# Patient Record
Sex: FEMALE | Race: WHITE | Employment: FULL TIME | URBAN - METROPOLITAN AREA
[De-identification: names, ages, dates, MRNs, and addresses within clinical notes are randomized per-mention and may not be internally consistent; named-entity substitution may affect disease eponyms.]

---

## 2019-01-12 ENCOUNTER — HOSPITAL ENCOUNTER (EMERGENCY)
Age: 39
Discharge: HOME | End: 2019-01-13
Attending: EMERGENCY MEDICINE
Payer: COMMERCIAL

## 2019-01-12 VITALS
BODY MASS INDEX: 25.61 KG/M2 | TEMPERATURE: 98 F | DIASTOLIC BLOOD PRESSURE: 63 MMHG | HEART RATE: 94 BPM | WEIGHT: 150 LBS | OXYGEN SATURATION: 100 % | HEIGHT: 64 IN | RESPIRATION RATE: 20 BRPM | SYSTOLIC BLOOD PRESSURE: 114 MMHG

## 2019-01-12 DIAGNOSIS — R10.31 ABDOMINAL PAIN, RIGHT LOWER QUADRANT: ICD-10-CM

## 2019-01-12 DIAGNOSIS — K52.9 ENTERITIS: Primary | ICD-10-CM

## 2019-01-12 LAB
ALBUMIN SERPL-MCNC: 4.2 G/DL (ref 3.4–5)
ALBUMIN/GLOB SERPL: 1.2 {RATIO} (ref 0.8–1.7)
ALP SERPL-CCNC: 56 U/L (ref 45–117)
ALT SERPL-CCNC: 16 U/L (ref 13–56)
ANION GAP SERPL CALC-SCNC: 9 MMOL/L (ref 3–18)
AST SERPL-CCNC: 12 U/L (ref 15–37)
BASOPHILS # BLD: 0 K/UL (ref 0–0.1)
BASOPHILS NFR BLD: 0 % (ref 0–2)
BILIRUB SERPL-MCNC: 2.2 MG/DL (ref 0.2–1)
BUN SERPL-MCNC: 8 MG/DL (ref 7–18)
BUN/CREAT SERPL: 9 (ref 12–20)
CALCIUM SERPL-MCNC: 9.4 MG/DL (ref 8.5–10.1)
CHLORIDE SERPL-SCNC: 104 MMOL/L (ref 100–108)
CO2 SERPL-SCNC: 26 MMOL/L (ref 21–32)
CREAT SERPL-MCNC: 0.89 MG/DL (ref 0.6–1.3)
DIFFERENTIAL METHOD BLD: ABNORMAL
EOSINOPHIL # BLD: 0 K/UL (ref 0–0.4)
EOSINOPHIL NFR BLD: 0 % (ref 0–5)
ERYTHROCYTE [DISTWIDTH] IN BLOOD BY AUTOMATED COUNT: 12.8 % (ref 11.6–14.5)
GLOBULIN SER CALC-MCNC: 3.4 G/DL (ref 2–4)
GLUCOSE SERPL-MCNC: 118 MG/DL (ref 74–99)
HCG SERPL QL: NEGATIVE
HCT VFR BLD AUTO: 34.4 % (ref 35–45)
HGB BLD-MCNC: 11.5 G/DL (ref 12–16)
LYMPHOCYTES # BLD: 1.1 K/UL (ref 0.9–3.6)
LYMPHOCYTES NFR BLD: 6 % (ref 21–52)
MCH RBC QN AUTO: 29.6 PG (ref 24–34)
MCHC RBC AUTO-ENTMCNC: 33.4 G/DL (ref 31–37)
MCV RBC AUTO: 88.7 FL (ref 74–97)
MONOCYTES # BLD: 0.8 K/UL (ref 0.05–1.2)
MONOCYTES NFR BLD: 5 % (ref 3–10)
NEUTS SEG # BLD: 16 K/UL (ref 1.8–8)
NEUTS SEG NFR BLD: 89 % (ref 40–73)
PLATELET # BLD AUTO: 357 K/UL (ref 135–420)
PMV BLD AUTO: 9.7 FL (ref 9.2–11.8)
POTASSIUM SERPL-SCNC: 3.7 MMOL/L (ref 3.5–5.5)
PROT SERPL-MCNC: 7.6 G/DL (ref 6.4–8.2)
RBC # BLD AUTO: 3.88 M/UL (ref 4.2–5.3)
SODIUM SERPL-SCNC: 139 MMOL/L (ref 136–145)
WBC # BLD AUTO: 17.9 K/UL (ref 4.6–13.2)

## 2019-01-12 PROCEDURE — 74011250636 HC RX REV CODE- 250/636: Performed by: EMERGENCY MEDICINE

## 2019-01-12 PROCEDURE — 84703 CHORIONIC GONADOTROPIN ASSAY: CPT

## 2019-01-12 PROCEDURE — 80053 COMPREHEN METABOLIC PANEL: CPT

## 2019-01-12 PROCEDURE — 96375 TX/PRO/DX INJ NEW DRUG ADDON: CPT

## 2019-01-12 PROCEDURE — 96374 THER/PROPH/DIAG INJ IV PUSH: CPT

## 2019-01-12 PROCEDURE — 99284 EMERGENCY DEPT VISIT MOD MDM: CPT

## 2019-01-12 PROCEDURE — 85025 COMPLETE CBC W/AUTO DIFF WBC: CPT

## 2019-01-12 RX ORDER — ONDANSETRON 2 MG/ML
4 INJECTION INTRAMUSCULAR; INTRAVENOUS
Status: COMPLETED | OUTPATIENT
Start: 2019-01-12 | End: 2019-01-12

## 2019-01-12 RX ORDER — MORPHINE SULFATE 4 MG/ML
4 INJECTION INTRAVENOUS
Status: COMPLETED | OUTPATIENT
Start: 2019-01-12 | End: 2019-01-12

## 2019-01-12 RX ADMIN — ONDANSETRON 4 MG: 2 INJECTION INTRAMUSCULAR; INTRAVENOUS at 23:45

## 2019-01-12 RX ADMIN — MORPHINE SULFATE 4 MG: 4 INJECTION INTRAVENOUS at 23:45

## 2019-01-13 ENCOUNTER — APPOINTMENT (OUTPATIENT)
Dept: CT IMAGING | Age: 39
End: 2019-01-13
Attending: EMERGENCY MEDICINE
Payer: COMMERCIAL

## 2019-01-13 PROCEDURE — 74011250637 HC RX REV CODE- 250/637: Performed by: EMERGENCY MEDICINE

## 2019-01-13 PROCEDURE — 74011636320 HC RX REV CODE- 636/320: Performed by: EMERGENCY MEDICINE

## 2019-01-13 PROCEDURE — 74177 CT ABD & PELVIS W/CONTRAST: CPT

## 2019-01-13 RX ORDER — DICYCLOMINE HYDROCHLORIDE 20 MG/1
20 TABLET ORAL EVERY 6 HOURS
Qty: 20 TAB | Refills: 0 | Status: SHIPPED | OUTPATIENT
Start: 2019-01-13 | End: 2019-01-18

## 2019-01-13 RX ORDER — HYDROCODONE BITARTRATE AND ACETAMINOPHEN 5; 325 MG/1; MG/1
TABLET ORAL
Qty: 8 TAB | Refills: 0 | Status: SHIPPED | OUTPATIENT
Start: 2019-01-13 | End: 2019-04-19

## 2019-01-13 RX ORDER — DICYCLOMINE HYDROCHLORIDE 10 MG/1
20 CAPSULE ORAL
Status: COMPLETED | OUTPATIENT
Start: 2019-01-13 | End: 2019-01-13

## 2019-01-13 RX ORDER — ONDANSETRON 4 MG/1
TABLET, ORALLY DISINTEGRATING ORAL
Qty: 10 TAB | Refills: 0 | Status: SHIPPED | OUTPATIENT
Start: 2019-01-13 | End: 2019-04-19

## 2019-01-13 RX ADMIN — DICYCLOMINE HYDROCHLORIDE 20 MG: 10 CAPSULE ORAL at 02:00

## 2019-01-13 RX ADMIN — IOPAMIDOL 75 ML: 612 INJECTION, SOLUTION INTRAVENOUS at 00:15

## 2019-01-13 NOTE — DISCHARGE INSTRUCTIONS
Gastroenteritis: Care Instructions  Your Care Instructions    Gastroenteritis is an illness that may cause nausea, vomiting, and diarrhea. It is sometimes called \"stomach flu. \" It can be caused by bacteria or a virus. You will probably begin to feel better in 1 to 2 days. In the meantime, get plenty of rest and make sure you do not become dehydrated. Dehydration occurs when your body loses too much fluid. Follow-up care is a key part of your treatment and safety. Be sure to make and go to all appointments, and call your doctor if you are having problems. It's also a good idea to know your test results and keep a list of the medicines you take. How can you care for yourself at home? · If your doctor prescribed antibiotics, take them as directed. Do not stop taking them just because you feel better. You need to take the full course of antibiotics. · Drink plenty of fluids to prevent dehydration, enough so that your urine is light yellow or clear like water. Choose water and other caffeine-free clear liquids until you feel better. If you have kidney, heart, or liver disease and have to limit fluids, talk with your doctor before you increase your fluid intake. · Drink fluids slowly, in frequent, small amounts, because drinking too much too fast can cause vomiting. · Begin eating mild foods, such as dry toast, yogurt, applesauce, bananas, and rice. Avoid spicy, hot, or high-fat foods, and do not drink alcohol or caffeine for a day or two. Do not drink milk or eat ice cream until you are feeling better. How to prevent gastroenteritis  · Keep hot foods hot and cold foods cold. · Do not eat meats, dressings, salads, or other foods that have been kept at room temperature for more than 2 hours. · Use a thermometer to check your refrigerator. It should be between 34°F and 40°F.  · Defrost meats in the refrigerator or microwave, not on the kitchen counter. · Keep your hands and your kitchen clean.  Wash your hands, cutting boards, and countertops with hot soapy water frequently. · Cook meat until it is well done. · Do not eat raw eggs or uncooked sauces made with raw eggs. · Do not take chances. If food looks or tastes spoiled, throw it out. When should you call for help? Call 911 anytime you think you may need emergency care. For example, call if:    · You vomit blood or what looks like coffee grounds.     · You passed out (lost consciousness).     · You pass maroon or very bloody stools.    Call your doctor now or seek immediate medical care if:    · You have severe belly pain.     · You have signs of needing more fluids. You have sunken eyes, a dry mouth, and pass only a little dark urine.     · You feel like you are going to faint.     · You have increased belly pain that does not go away in 1 to 2 days.     · You have new or increased nausea, or you are vomiting.     · You have a new or higher fever.     · Your stools are black and tarlike or have streaks of blood.    Watch closely for changes in your health, and be sure to contact your doctor if:    · You are dizzy or lightheaded.     · You urinate less than usual, or your urine is dark yellow or brown.     · You do not feel better with each day that goes by. Where can you learn more? Go to http://nena-noreen.info/. Enter N142 in the search box to learn more about \"Gastroenteritis: Care Instructions. \"  Current as of: November 18, 2017  Content Version: 11.8  © 4252-2948 [employer]. Care instructions adapted under license by [employer] (which disclaims liability or warranty for this information). If you have questions about a medical condition or this instruction, always ask your healthcare professional. [address] any warranty or liability for your use of this information.

## 2019-01-13 NOTE — ED TRIAGE NOTES
Patient with complaints of chills and aches yesterday. Patient states that she started with lower abdominal pain. Patient works at [employer] where they did a CBC.

## 2019-01-13 NOTE — ED PROVIDER NOTES
EMERGENCY DEPARTMENT HISTORY AND PHYSICAL EXAM 
 
Date: 2019 Patient Name: Yovani Bronson History of Presenting Illness Chief Complaint Patient presents with  Abdominal Pain History Provided By: Patient Chief Complaint: Abdominal pain Duration: 2 Days Timing:  Worsening Location: RLQ Quality: Cramping Severity: 8 out of 10 Modifying Factors: Motrin taken to moderate relief Associated Symptoms: nausea Additional History (Context):  
11:01 PM 
Yovani Bronson is a 34 y.o. female with PMHX of dermoid cysts who presents to the emergency department C/O worsening 8/10 RLQ cramping onset yesterday that significantly worsened 2 hours ago. Associated sxs include nausea. Patient reports that she ate [employer] yesterday and had mild abdominal pain that was controllable with Motrin. She states that her pain was controllable throughout the day until it significantly worsened 2 hours ago. Notes PMHx of ovarian cysts but states that her pain does not feel similar. LBM was yesterday but she states that she feels like she couldn't pass gas today. Patient works at [employer] and had a CBC there that indicated a potential infection. Denies suspicion for pregnancy. Endorses occasional EtOH and denies tobacco use. Pt denies diarrhea, vomiting, and any other sxs or complaints. PCP: Thien Rodriguez MD 
 
 
 
Past History Past Medical History: 
Past Medical History:  
Diagnosis Date  Dermoid 2017 Past Surgical History: 
Past Surgical History:  
Procedure Laterality Date  HX  SECTION    
 LAP,TUBAL CAUTERY Family History: 
Family History Problem Relation Age of Onset  Hypertension Mother  Hypertension Father  Diabetes Maternal Grandmother  Colon Cancer Maternal Grandfather  Diabetes Paternal Grandmother Social History: 
Social History Tobacco Use  Smoking status: Never Smoker  Smokeless tobacco: Never Used Substance Use Topics  Alcohol use: Yes  Drug use: Not on file Allergies: 
No Known Allergies Review of Systems Review of Systems Constitutional: Negative. Negative for chills, diaphoresis and fever. HENT: Negative. Negative for congestion, sore throat and trouble swallowing. Eyes: Negative. Negative for photophobia, pain and redness. Respiratory: Negative. Negative for cough, chest tightness, shortness of breath and wheezing. Cardiovascular: Negative. Negative for chest pain and palpitations. Gastrointestinal: Positive for abdominal pain (RLQ cramping) and nausea. Negative for blood in stool, diarrhea and vomiting. Genitourinary: Negative for difficulty urinating, dysuria and frequency. Musculoskeletal: Negative. Negative for arthralgias, myalgias, neck pain and neck stiffness. Skin: Negative. Neurological: Negative. Negative for dizziness, tremors, seizures, syncope, speech difficulty, light-headedness and headaches. Psychiatric/Behavioral: Negative. Negative for confusion. The patient is not nervous/anxious. All other systems reviewed and are negative. Physical Exam  
 
Vitals:  
 01/12/19 2221 BP: 114/63 Pulse: 94 Resp: 20 Temp: 98 °F (36.7 °C) SpO2: 100% Weight: 68 kg (150 lb) Height: 5' 4\" (1.626 m) Physical Exam  
Constitutional: She is oriented to person, place, and time. She appears well-developed and well-nourished. Non-toxic appearance. Very uncomfortable, tearful, non-toxic HENT:  
Head: Normocephalic and atraumatic. Eyes: EOM are normal. Pupils are equal, round, and reactive to light. No scleral icterus. No pallor Neck: Normal range of motion. Neck supple. Cardiovascular: Normal rate, normal heart sounds and intact distal pulses. Pulmonary/Chest: Effort normal and breath sounds normal. No respiratory distress. Abdominal: Soft. She exhibits no distension. Bowel sounds are decreased. There is tenderness in the right lower quadrant. There is guarding. RLQ tenderness with guarding. No visible surgical scars, non-distended Musculoskeletal: Normal range of motion. She exhibits no edema. Neurological: She is alert and oriented to person, place, and time. Skin: Skin is warm and dry. No rash noted. Psychiatric: Her behavior is normal. Her mood appears anxious. Somewhat anxious Nursing note and vitals reviewed. Diagnostic Study Results Labs - Recent Results (from the past 12 hour(s)) CBC WITH AUTOMATED DIFF Collection Time: 01/12/19 11:20 PM  
Result Value Ref Range WBC 17.9 (H) 4.6 - 13.2 K/uL  
 RBC 3.88 (L) 4.20 - 5.30 M/uL  
 HGB 11.5 (L) 12.0 - 16.0 g/dL HCT 34.4 (L) 35.0 - 45.0 % MCV 88.7 74.0 - 97.0 FL  
 MCH 29.6 24.0 - 34.0 PG  
 MCHC 33.4 31.0 - 37.0 g/dL  
 RDW 12.8 11.6 - 14.5 % PLATELET 283 785 - 526 K/uL MPV 9.7 9.2 - 11.8 FL  
 NEUTROPHILS 89 (H) 40 - 73 % LYMPHOCYTES 6 (L) 21 - 52 % MONOCYTES 5 3 - 10 % EOSINOPHILS 0 0 - 5 % BASOPHILS 0 0 - 2 %  
 ABS. NEUTROPHILS 16.0 (H) 1.8 - 8.0 K/UL  
 ABS. LYMPHOCYTES 1.1 0.9 - 3.6 K/UL  
 ABS. MONOCYTES 0.8 0.05 - 1.2 K/UL  
 ABS. EOSINOPHILS 0.0 0.0 - 0.4 K/UL  
 ABS. BASOPHILS 0.0 0.0 - 0.1 K/UL  
 DF AUTOMATED METABOLIC PANEL, COMPREHENSIVE Collection Time: 01/12/19 11:20 PM  
Result Value Ref Range Sodium 139 136 - 145 mmol/L Potassium 3.7 3.5 - 5.5 mmol/L Chloride 104 100 - 108 mmol/L  
 CO2 26 21 - 32 mmol/L Anion gap 9 3.0 - 18 mmol/L Glucose 118 (H) 74 - 99 mg/dL BUN 8 7.0 - 18 MG/DL Creatinine 0.89 0.6 - 1.3 MG/DL  
 BUN/Creatinine ratio 9 (L) 12 - 20 GFR est AA >60 >60 ml/min/1.73m2 GFR est non-AA >60 >60 ml/min/1.73m2 Calcium 9.4 8.5 - 10.1 MG/DL Bilirubin, total 2.2 (H) 0.2 - 1.0 MG/DL  
 ALT (SGPT) 16 13 - 56 U/L  
 AST (SGOT) 12 (L) 15 - 37 U/L Alk. phosphatase 56 45 - 117 U/L Protein, total 7.6 6.4 - 8.2 g/dL Albumin 4.2 3.4 - 5.0 g/dL Globulin 3.4 2.0 - 4.0 g/dL A-G Ratio 1.2 0.8 - 1.7 HCG QL SERUM Collection Time: 01/12/19 11:20 PM  
Result Value Ref Range HCG, Ql. NEGATIVE  NEG Radiologic Studies -  
CT ABD PELV W CONT Final Result IMPRESSION:  
  
Inflamed small bowel loops particularly in the left abdomen with stranding in  
the mesentery and free fluid seen. Correlate for enteritis. Probable complex free fluid posterior to the uterus. Consider contrast enhanced CT or pelvic ultrasound to better evaluate and exclude adjacent mass. CT Results  (Last 48 hours) 01/13/19 0037  CT ABD PELV W CONT Final result Impression:  IMPRESSION:  
   
Inflamed small bowel loops particularly in the left abdomen with stranding in  
the mesentery and free fluid seen. Correlate for enteritis. Probable complex free fluid posterior to the uterus. Consider contrast enhanced CT or pelvic ultrasound to better evaluate and exclude adjacent mass. Narrative:  EXAM: CT of the abdomen and pelvis INDICATION: Pain. COMPARISON: None. TECHNIQUE: Axial CT imaging of the abdomen and pelvis was performed without  
intravenous contrast. Multiplanar reformats were generated. One or more dose  
reduction techniques were used on this CT: automated exposure control,  
adjustment of the mAs and/or kVp according to patient size, and iterative  
reconstruction techniques. The specific techniques used on this CT exam have  
been documented in the patient's electronic medical record. Digital Imaging and  
Communications in Medicine (DICOM) format image data are available to  
nonaffiliated external healthcare facilities or entities on a secure, media  
free, reciprocally searchable basis with patient authorization for at least a  
12-month period after this study. Lack of IV contrast is limited particularly  
regarding possible vascular or soft tissue abnormality. _______________ FINDINGS:  
   
LOWER CHEST: Unremarkable. LIVER, BILIARY: Liver is normal. No biliary dilation. Gallbladder is present. PANCREAS: Normal.  
   
SPLEEN: Normal.  
   
ADRENALS: Normal.  
   
KIDNEYS: Normal.  
   
LYMPH NODES: No enlarged lymph nodes. GASTROINTESTINAL TRACT: GI tract evaluation is limited without oral contrast.  
There is wall thickening and adjacent inflammatory stranding involving small  
bowel loops in the left abdomen. Fluid-filled small bowel loops in the low  
abdomen and pelvis. Short segment of distended small bowel in the central to  
left abdomen posterior to transverse colon with distal narrowing. Does not  
appear to be causing obstruction. The colon is not distended. Appendix is normal  
   
PELVIC ORGANS: Complex density is identified posterior to the uterus and an area  
measuring 9.1 x 4.9 cm. Could be infected or hemorrhagic fluid. Associated mass  
is not excluded. Consider contrast enhanced CT or pelvic ultrasound to better  
evaluate. Right ovarian cysts measuring up to 1.5 cm VASCULATURE: Unremarkable. BONES: No acute or aggressive osseous abnormalities identified. OTHER: There is haziness in the mesentery particularly on the left and central  
abdomen. Small amount of free fluid in the abdomen.  
   
_______________ CXR Results  (Last 48 hours) None Medications given in the ED- Medications  
dicyclomine (BENTYL) capsule 20 mg (not administered) morphine injection 4 mg (4 mg IntraVENous Given 1/12/19 2345) ondansetron [facility]) injection 4 mg (4 mg IntraVENous Given 1/12/19 2345) iopamidol (ISOVUE 300) 61 % contrast injection  mL (75 mL IntraVENous Given 1/13/19 0015) Medical Decision Making I am the first provider for this patient. I reviewed the vital signs, available nursing notes, past medical history, past surgical history, family history and social history. Vital Signs-Reviewed the patient's vital signs. Pulse Oximetry Analysis - 100% on RA Records Reviewed: Nursing Notes Provider Notes (Medical Decision Making): Strong suggestion of appendicitis or Crohns. Possibly obstruction followed by ovarian torsion or cyst, including rupture. Will check lab work, run CT scan, treat pain immediately, and disposition pending results. Procedures: 
Procedures ED Course:  
11:01 PM Initial assessment performed. The patients presenting problems have been discussed, and they are in agreement with the care plan formulated and outlined with them. I have encouraged them to ask questions as they arise throughout their visit. Diagnosis and Disposition DISCHARGE NOTE: 
1:45 AM 
Danielle Myers's  results have been reviewed with her. She has been counseled regarding her diagnosis, treatment, and plan. She verbally conveys understanding and agreement of the signs, symptoms, diagnosis, treatment and prognosis and additionally agrees to follow up as discussed. She also agrees with the care-plan and conveys that all of her questions have been answered. I have also provided discharge instructions for her that include: educational information regarding their diagnosis and treatment, and list of reasons why they would want to return to the ED prior to their follow-up appointment, should her condition change. She has been provided with education for proper emergency department utilization. CLINICAL IMPRESSION: 
 
1. Enteritis 2. Abdominal pain, right lower quadrant PLAN: 
1. D/C Home 2. Current Discharge Medication List  
  
START taking these medications Details  
ondansetron (ZOFRAN ODT) 4 mg disintegrating tablet Take 1-2 tablets every 6-8 hours as needed for nausea and vomiting. Qty: 10 Tab, Refills: 0  
  
dicyclomine (BENTYL) 20 mg tablet Take 1 Tab by mouth every six (6) hours for 20 doses. Qty: 20 Tab, Refills: 0 HYDROcodone-acetaminophen (NORCO) 5-325 mg per tablet Take 1-2 tablets PO every 4-6 hours as needed for pain control. If over the counter ibuprofen or acetaminophen was suggested, then only take the vicodin for pain not well controlled with the over the counter medication. Qty: 8 Tab, Refills: 0 Associated Diagnoses: Enteritis 3. Follow-up Information Follow up With Specialties Details Why Contact Info Alma Chicas MD Family Practice Schedule an appointment as soon as possible for a visit in 2 days For primary care follow up [place] [id number] 
[phone] [facility] EMERGENCY DEPT Emergency Medicine Go to As needed, If symptoms worsen [address] [place] [id number] 
[phone]  
  
 
_______________________________ Attestations: This note is prepared by Moi Mcguire, acting as Scribe for Radha Worthy. Radha Gore MD. Radha Wrothy. Radha Gore MD:  The scribe's documentation has been prepared under my direction and personally reviewed by me in its entirety. I confirm that the note above accurately reflects all work, treatment, procedures, and medical decision making performed by me. 
_______________________________

## 2019-04-17 ENCOUNTER — TELEPHONE (OUTPATIENT)
Dept: ONCOLOGY | Age: 39
End: 2019-04-17

## 2019-04-17 NOTE — TELEPHONE ENCOUNTER
Called patient to request that she drink a copy of her CT scan on a disk. Patient is aware and will bring it with her.

## 2019-04-18 ENCOUNTER — OFFICE VISIT (OUTPATIENT)
Dept: ONCOLOGY | Age: 39
End: 2019-04-18

## 2019-04-18 VITALS
HEART RATE: 61 BPM | SYSTOLIC BLOOD PRESSURE: 124 MMHG | OXYGEN SATURATION: 99 % | RESPIRATION RATE: 14 BRPM | BODY MASS INDEX: 24.07 KG/M2 | TEMPERATURE: 98 F | DIASTOLIC BLOOD PRESSURE: 80 MMHG | HEIGHT: 64 IN | WEIGHT: 141 LBS

## 2019-04-18 DIAGNOSIS — N70.93 TUBO-OVARIAN ABSCESS: Primary | ICD-10-CM

## 2019-04-18 RX ORDER — METRONIDAZOLE 500 MG/1
500 TABLET ORAL 3 TIMES DAILY
Qty: 14 TAB | Refills: 0 | Status: SHIPPED | OUTPATIENT
Start: 2019-04-18 | End: 2019-05-14 | Stop reason: ALTCHOICE

## 2019-04-18 RX ORDER — IBUPROFEN 200 MG
TABLET ORAL AS NEEDED
COMMUNITY

## 2019-04-18 RX ORDER — CIPROFLOXACIN 500 MG/1
500 TABLET ORAL 2 TIMES DAILY
Qty: 28 TAB | Refills: 0 | Status: SHIPPED | OUTPATIENT
Start: 2019-04-18 | End: 2019-05-02

## 2019-04-18 NOTE — PATIENT INSTRUCTIONS
Tubo-Ovarian Abscess: Care Instructions Your Care Instructions A tubo-ovarian abscess is a pocket of pus. It forms because of an infection in a fallopian tube and ovary. A tubo-ovarian abscess is most often caused by pelvic inflammatory disease (PID). Your doctor will prescribe antibiotics to treat the abscess. A very large abscess or one that does not go away after antibiotic treatment may need to be drained. Sometimes surgery is used to remove the infected tube and ovary. Follow-up care is a key part of your treatment and safety. Be sure to make and go to all appointments, and call your doctor if you are having problems. It's also a good idea to know your test results and keep a list of the medicines you take. How can you care for yourself at home? · Take your antibiotics as directed. Do not stop taking them because you feel better. You need to take the full course of antibiotics. · Rest until you feel better. · Take anti-inflammatory medicines to reduce pain. These include ibuprofen (Advil, Motrin) and naproxen (Aleve). Be safe with medicines. Read and follow all instructions on the label. · Use a hot water bottle or a heating pad set on low for belly pain. · Do not have sex or use tampons (use pads instead) until you have taken all the medicine, your pain is gone, and you feel completely well. · Talk to any sex partners you have had in the past 2 months. They need to be tested and may need to be treated for a sexually transmitted disease (STD). When should you call for help? Call your doctor now or seek immediate medical care if: 
  · You have a new or higher fever.  
  · You have unusual vaginal bleeding.  
  · You have new or worse belly or pelvic pain.  
  · You have vaginal discharge that has increased in amount or smells bad.  
 Watch closely for changes in your health, and be sure to contact your doctor if: 
  · You do not get better as expected. Where can you learn more? Go to http://nena-noreen.info/. Enter V246 in the search box to learn more about \"Tubo-Ovarian Abscess: Care Instructions. \" Current as of: May 14, 2018 Content Version: 11.9 © 1840-7092 [employer], [place]. Care instructions adapted under license by [employer] (which disclaims liability or warranty for this information). If you have questions about a medical condition or this instruction, always ask your healthcare professional. [place] [address] any warranty or liability for your use of this information.

## 2019-04-18 NOTE — PROGRESS NOTES
[address], [address] [facility] Mehreen Meeks, [address] [facility] 
[address] [facility], [address] [place], [address] [place], [place] [id number] [phone] Anh Parnell DO Patient ID: 
Name:  Luz Elena Butts MRN:  [id number] :  1980/38 y.o. Date:  2019 HISTORY OF PRESENT ILLNESS: 
Luz Elena Butts is a 34 y.o.  premenopausal female referred by Dr. Brody Kyle for tubo-ovarian abscess. Pt was diagnosed with TOA in January and underwent laparoscopic evaluation. Was then admitted on IV abx x 1 wk and discharged with oral abx x 2 wks. She felt better but then started having increasing pain so she was seen by gyn in 2019 and had an ultrasound with persistent TOA and was placed on oral abx for another week. . She then had a CT scan and seen by dr. Brody Kyle. Pt continues to have chronic pelvic pain. Denies fever/chills. Labs: 
None Imaging Scanned in media ROS:  
As above There are no active problems to display for this patient. Past Medical History:  
Diagnosis Date  Tubo-ovarian abscess Past Surgical History:  
Procedure Laterality Date  HX  SECTION  , 2006 X2  
 HX TUBAL LIGATION Bilateral 2006  HX WISDOM TEETH EXTRACTION    
 upper teeth OB History He Shah 2 Para 2 Term 2  AB Living  
2 SAB  
   
 TAB Ectopic Molar Multiple Live Births  
0 Obstetric Comments C-sections X2 Social History Tobacco Use  Smoking status: Passive Smoke Exposure - Never Smoker  Smokeless tobacco: Never Used  Tobacco comment:  vapes Substance Use Topics  Alcohol use: Yes Comment: \"occassionally\" wine Family History Problem Relation Age of Onset  Hypertension Mother  High Cholesterol Mother  Hypertension Father  High Cholesterol Father  Other Father Pre-diabetic  No Known Problems Brother  Colon Cancer Maternal Aunt  Hypertension Maternal Grandmother  Stroke Maternal Grandmother  Hypertension Maternal Grandfather  Stroke Maternal Grandfather  Colon Cancer Maternal Grandfather  Diabetes Paternal Grandmother  Hypertension Paternal Grandmother  Stroke Paternal Grandmother  Hypertension Paternal Grandfather  Stroke Paternal Grandfather Current Outpatient Medications Medication Sig  ibuprofen (MOTRIN) 200 mg tablet Take  by mouth as needed for Pain.  fexofenadine HCl (ALLEGRA PO) Take  by mouth as needed for Other (allergies).  fluticasone propionate (FLONASE NA) by Nasal route as needed for Other (allergies).  ciprofloxacin HCl (CIPRO) 500 mg tablet Take 1 Tab by mouth two (2) times a day for 14 days.  metroNIDAZOLE (FLAGYL) 500 mg tablet Take 1 Tab by mouth three (3) times daily. No current facility-administered medications for this visit. No Known Allergies OBJECTIVE: 
 
Physical Exam 
VITAL SIGNS: Visit Vitals /80 (BP 1 Location: Left arm, BP Patient Position: Sitting) Pulse 61 Temp 98 °F (36.7 °C) (Oral) Resp 14 Ht 5' 4\" (1.626 m) Wt 64 kg (141 lb) LMP 04/13/2019 SpO2 99% BMI 24.20 kg/m² GENERAL TRELL: in no apparent distress and well developed and well nourished  
deferred IMPRESSION/PLAN: 
1. Persistent TOA -will have CD uploaded and plan for IR drainage 
 -cont PO cipro/flagyl x 2 wks 
 -will reimage after 2 wks. -explained may require surgery if not resolved with drain/abx 
 -pt understands and in agreement with plan The total time spent was 60 minutes regarding this patients diagnosis of tubo-ovarian abscess and >50% of this time was spent counseling and coordinating care Janet Flores DO Gynecologic Oncology 4/18/201910:22 AM

## 2019-04-18 NOTE — PROGRESS NOTES
Grace Hernandez, a 34 y.o. female,  is here for Chief Complaint Patient presents with  New Patient  
  referred by Dr. Willi Russo for a tuboovarian abscess Visit Vitals /80 (BP 1 Location: Left arm, BP Patient Position: Sitting) Pulse 61 Temp 98 °F (36.7 °C) (Oral) Resp 14 Ht 5' 4\" (1.626 m) Wt 64 kg (141 lb) SpO2 99% BMI 24.20 kg/m² Patient reports constant pain in her pelvic area, currently rated 2 out of 10. Has been taking ibuprofen to help alleviate it. Patient reports that it does feel better on antibiotics. She was previously given Cipro and Flagyl. She did develop loose stools because of them, however that is starting to get better. Denies any unusual vaginal bleeding, discharge, irritation, or odor. No burning, discomfort, or irritation with urination.

## 2019-04-19 ENCOUNTER — TELEPHONE (OUTPATIENT)
Dept: ONCOLOGY | Age: 39
End: 2019-04-19

## 2019-04-19 DIAGNOSIS — N70.93 TUBO-OVARIAN ABSCESS: Primary | ICD-10-CM

## 2019-04-19 NOTE — TELEPHONE ENCOUNTER
LMOR re: drain placement. Per chart, patient was contacted by Reagan Woodward in IR. Advised patient to call our office with any additional questions or concerns or with new or worsening sx's, otherwise plan for procedure as scheduled on 4/23.

## 2019-04-19 NOTE — PROGRESS NOTES
PT aware of NPO status. NPO after MN night prior to procedure. PT aware of need to hold anticoagulants per protocol. Pt denies. PT aware of potential for sedation administration and need for  at discharge. PT aware of arrival time pre procedure. Arrive at [facility] radiology waiting room on 4/23/19 at 0930 for scheduled procedure to occur at 1100. Pt states no questions at this time. Gave pt [facility] radiology rn phone number [phone].

## 2019-04-22 NOTE — TELEPHONE ENCOUNTER
[facility] [place] re: IR callback re: procedure and whether she was able to pickup at least partial fill of Cipro over the weekend. Requested call back to office.

## 2019-04-22 NOTE — TELEPHONE ENCOUNTER
Patient returned WINSOME Joe's call and stated that she was able to get the Cipro and received a call answering all of her questions.

## 2019-04-23 ENCOUNTER — HOSPITAL ENCOUNTER (OUTPATIENT)
Dept: CT IMAGING | Age: 39
Discharge: HOME | End: 2019-04-23
Attending: RADIOLOGY | Admitting: RADIOLOGY
Payer: COMMERCIAL

## 2019-04-23 VITALS
TEMPERATURE: 98 F | WEIGHT: 140 LBS | DIASTOLIC BLOOD PRESSURE: 65 MMHG | HEART RATE: 82 BPM | SYSTOLIC BLOOD PRESSURE: 101 MMHG | RESPIRATION RATE: 16 BRPM | OXYGEN SATURATION: 98 % | BODY MASS INDEX: 23.9 KG/M2 | HEIGHT: 64 IN

## 2019-04-23 DIAGNOSIS — N70.93 TUBO-OVARIAN ABSCESS: ICD-10-CM

## 2019-04-23 LAB
ANION GAP SERPL CALC-SCNC: 6 MMOL/L (ref 3–18)
APTT PPP: 26.9 SEC (ref 23–36.4)
BASOPHILS # BLD: 0 K/UL (ref 0–0.1)
BASOPHILS NFR BLD: 0 % (ref 0–2)
BUN SERPL-MCNC: 10 MG/DL (ref 7–18)
BUN/CREAT SERPL: 12 (ref 12–20)
CALCIUM SERPL-MCNC: 8.8 MG/DL (ref 8.5–10.1)
CHLORIDE SERPL-SCNC: 109 MMOL/L (ref 100–108)
CO2 SERPL-SCNC: 27 MMOL/L (ref 21–32)
CREAT SERPL-MCNC: 0.83 MG/DL (ref 0.6–1.3)
DIFFERENTIAL METHOD BLD: ABNORMAL
EOSINOPHIL # BLD: 0.1 K/UL (ref 0–0.4)
EOSINOPHIL NFR BLD: 2 % (ref 0–5)
ERYTHROCYTE [DISTWIDTH] IN BLOOD BY AUTOMATED COUNT: 13 % (ref 11.6–14.5)
GLUCOSE SERPL-MCNC: 89 MG/DL (ref 74–99)
HCT VFR BLD AUTO: 34.3 % (ref 35–45)
HGB BLD-MCNC: 11.1 G/DL (ref 12–16)
INR PPP: 1.1 (ref 0.8–1.2)
LYMPHOCYTES # BLD: 1.3 K/UL (ref 0.9–3.6)
LYMPHOCYTES NFR BLD: 24 % (ref 21–52)
MCH RBC QN AUTO: 27.5 PG (ref 24–34)
MCHC RBC AUTO-ENTMCNC: 32.4 G/DL (ref 31–37)
MCV RBC AUTO: 84.9 FL (ref 74–97)
MONOCYTES # BLD: 0.7 K/UL (ref 0.05–1.2)
MONOCYTES NFR BLD: 13 % (ref 3–10)
NEUTS SEG # BLD: 3.3 K/UL (ref 1.8–8)
NEUTS SEG NFR BLD: 61 % (ref 40–73)
PLATELET # BLD AUTO: 355 K/UL (ref 135–420)
PMV BLD AUTO: 9.9 FL (ref 9.2–11.8)
POTASSIUM SERPL-SCNC: 4.1 MMOL/L (ref 3.5–5.5)
PROTHROMBIN TIME: 13.9 SEC (ref 11.5–15.2)
RBC # BLD AUTO: 4.04 M/UL (ref 4.2–5.3)
SODIUM SERPL-SCNC: 142 MMOL/L (ref 136–145)
WBC # BLD AUTO: 5.4 K/UL (ref 4.6–13.2)

## 2019-04-23 PROCEDURE — 85610 PROTHROMBIN TIME: CPT

## 2019-04-23 PROCEDURE — 99152 MOD SED SAME PHYS/QHP 5/>YRS: CPT

## 2019-04-23 PROCEDURE — 36415 COLL VENOUS BLD VENIPUNCTURE: CPT

## 2019-04-23 PROCEDURE — 74011250636 HC RX REV CODE- 250/636

## 2019-04-23 PROCEDURE — 87075 CULTR BACTERIA EXCEPT BLOOD: CPT

## 2019-04-23 PROCEDURE — 77030018781 CT DRAIN ABS W CATH PERC

## 2019-04-23 PROCEDURE — 74011250636 HC RX REV CODE- 250/636: Performed by: RADIOLOGY

## 2019-04-23 PROCEDURE — 80048 BASIC METABOLIC PNL TOTAL CA: CPT

## 2019-04-23 PROCEDURE — 85730 THROMBOPLASTIN TIME PARTIAL: CPT

## 2019-04-23 PROCEDURE — 87070 CULTURE OTHR SPECIMN AEROBIC: CPT

## 2019-04-23 PROCEDURE — 85025 COMPLETE CBC W/AUTO DIFF WBC: CPT

## 2019-04-23 PROCEDURE — 87102 FUNGUS ISOLATION CULTURE: CPT

## 2019-04-23 RX ORDER — NALOXONE HYDROCHLORIDE 0.4 MG/ML
INJECTION, SOLUTION INTRAMUSCULAR; INTRAVENOUS; SUBCUTANEOUS
Status: DISCONTINUED
Start: 2019-04-23 | End: 2019-04-23 | Stop reason: WASHOUT

## 2019-04-23 RX ORDER — FENTANYL CITRATE 50 UG/ML
INJECTION, SOLUTION INTRAMUSCULAR; INTRAVENOUS
Status: COMPLETED
Start: 2019-04-23 | End: 2019-04-23

## 2019-04-23 RX ORDER — SODIUM CHLORIDE 9 MG/ML
25 INJECTION, SOLUTION INTRAVENOUS CONTINUOUS
Status: DISCONTINUED | OUTPATIENT
Start: 2019-04-23 | End: 2019-04-23 | Stop reason: HOSPADM

## 2019-04-23 RX ORDER — LIDOCAINE HYDROCHLORIDE 10 MG/ML
1-20 INJECTION INFILTRATION; PERINEURAL
Status: DISCONTINUED | OUTPATIENT
Start: 2019-04-23 | End: 2019-04-23 | Stop reason: HOSPADM

## 2019-04-23 RX ORDER — IBUPROFEN 400 MG/1
400 TABLET ORAL
Status: DISCONTINUED | OUTPATIENT
Start: 2019-04-23 | End: 2019-04-23 | Stop reason: HOSPADM

## 2019-04-23 RX ORDER — FENTANYL CITRATE 50 UG/ML
25-200 INJECTION, SOLUTION INTRAMUSCULAR; INTRAVENOUS
Status: DISCONTINUED | OUTPATIENT
Start: 2019-04-23 | End: 2019-04-23 | Stop reason: HOSPADM

## 2019-04-23 RX ORDER — FLUMAZENIL 0.1 MG/ML
INJECTION INTRAVENOUS
Status: DISCONTINUED
Start: 2019-04-23 | End: 2019-04-23 | Stop reason: WASHOUT

## 2019-04-23 RX ORDER — MIDAZOLAM HYDROCHLORIDE 1 MG/ML
.5-4 INJECTION, SOLUTION INTRAMUSCULAR; INTRAVENOUS
Status: DISCONTINUED | OUTPATIENT
Start: 2019-04-23 | End: 2019-04-23 | Stop reason: HOSPADM

## 2019-04-23 RX ORDER — LIDOCAINE HYDROCHLORIDE 10 MG/ML
INJECTION INFILTRATION; PERINEURAL
Status: COMPLETED
Start: 2019-04-23 | End: 2019-04-23

## 2019-04-23 RX ORDER — FLUMAZENIL 0.1 MG/ML
0.2 INJECTION INTRAVENOUS
Status: DISCONTINUED | OUTPATIENT
Start: 2019-04-23 | End: 2019-04-23 | Stop reason: HOSPADM

## 2019-04-23 RX ORDER — NALOXONE HYDROCHLORIDE 0.4 MG/ML
0.1 INJECTION, SOLUTION INTRAMUSCULAR; INTRAVENOUS; SUBCUTANEOUS AS NEEDED
Status: DISCONTINUED | OUTPATIENT
Start: 2019-04-23 | End: 2019-04-23 | Stop reason: HOSPADM

## 2019-04-23 RX ORDER — MIDAZOLAM HYDROCHLORIDE 1 MG/ML
INJECTION, SOLUTION INTRAMUSCULAR; INTRAVENOUS
Status: COMPLETED
Start: 2019-04-23 | End: 2019-04-23

## 2019-04-23 RX ADMIN — MIDAZOLAM HYDROCHLORIDE 1 MG: 1 INJECTION, SOLUTION INTRAMUSCULAR; INTRAVENOUS at 12:43

## 2019-04-23 RX ADMIN — LIDOCAINE HYDROCHLORIDE 9 ML: 10 INJECTION INFILTRATION; PERINEURAL at 13:14

## 2019-04-23 RX ADMIN — FENTANYL CITRATE 50 MCG: 50 INJECTION, SOLUTION INTRAMUSCULAR; INTRAVENOUS at 12:43

## 2019-04-23 RX ADMIN — SODIUM CHLORIDE 25 ML/HR: 900 INJECTION, SOLUTION INTRAVENOUS at 11:18

## 2019-04-23 RX ADMIN — MIDAZOLAM HYDROCHLORIDE 1 MG: 1 INJECTION, SOLUTION INTRAMUSCULAR; INTRAVENOUS at 12:47

## 2019-04-23 RX ADMIN — LIDOCAINE HYDROCHLORIDE 9 ML: 10 INJECTION, SOLUTION INFILTRATION; PERINEURAL at 13:14

## 2019-04-23 RX ADMIN — FENTANYL CITRATE 50 MCG: 50 INJECTION, SOLUTION INTRAMUSCULAR; INTRAVENOUS at 12:47

## 2019-04-23 NOTE — DISCHARGE INSTRUCTIONS
Patient Education        Tubo-Ovarian Abscess: Care Instructions  Your Care Instructions    A tubo-ovarian abscess is a pocket of pus. It forms because of an infection in a fallopian tube and ovary. A tubo-ovarian abscess is most often caused by pelvic inflammatory disease (PID). Your doctor will prescribe antibiotics to treat the abscess. A very large abscess or one that does not go away after antibiotic treatment may need to be drained. Sometimes surgery is used to remove the infected tube and ovary. Follow-up care is a key part of your treatment and safety. Be sure to make and go to all appointments, and call your doctor if you are having problems. It's also a good idea to know your test results and keep a list of the medicines you take. How can you care for yourself at home? · Take your antibiotics as directed. Do not stop taking them because you feel better. You need to take the full course of antibiotics. · Rest until you feel better. · Take anti-inflammatory medicines to reduce pain. These include ibuprofen (Advil, Motrin) and naproxen (Aleve). Be safe with medicines. Read and follow all instructions on the label. · Use a hot water bottle or a heating pad set on low for belly pain. · Do not have sex or use tampons (use pads instead) until you have taken all the medicine, your pain is gone, and you feel completely well. · Talk to any sex partners you have had in the past 2 months. They need to be tested and may need to be treated for a sexually transmitted disease (STD). When should you call for help? Call your doctor now or seek immediate medical care if:    · You have a new or higher fever.     · You have unusual vaginal bleeding.     · You have new or worse belly or pelvic pain.     · You have vaginal discharge that has increased in amount or smells bad.    Watch closely for changes in your health, and be sure to contact your doctor if:    · You do not get better as expected.    Where can you learn more? Go to http://nena-noreen.info/. Enter V246 in the search box to learn more about \"Tubo-Ovarian Abscess: Care Instructions. \"  Current as of: May 14, 2018  Content Version: 11.9  © 0166-7541 [employer]. Care instructions adapted under license by [employer] (which disclaims liability or warranty for this information). If you have questions about a medical condition or this instruction, always ask your healthcare professional. [address] any warranty or liability for your use of this information. DISCHARGE SUMMARY from Nurse    PATIENT INSTRUCTIONS:    After general anesthesia or intravenous sedation, for 24 hours or while taking prescription Narcotics:  · Limit your activities  · Do not drive and operate hazardous machinery  · Do not make important personal or business decisions  · Do  not drink alcoholic beverages  · If you have not urinated within 8 hours after discharge, please contact your surgeon on call. Report the following to your surgeon:  · Excessive pain, swelling, redness or odor of or around the surgical area  · Temperature over 100.5  · Nausea and vomiting lasting longer than 4 hours or if unable to take medications  · Any signs of decreased circulation or nerve impairment to extremity: change in color, persistent  numbness, tingling, coldness or increase pain  · Any questions    What to do at Home:  Recommended activity: Activity as tolerated and no driving for today,     *  Please give a list of your current medications to your Primary Care Provider. *  Please update this list whenever your medications are discontinued, doses are      changed, or new medications (including over-the-counter products) are added. *  Please carry medication information at all times in case of emergency situations.     These are general instructions for a healthy lifestyle:    No smoking/ No tobacco products/ Avoid exposure to second hand smoke  Surgeon General's Warning:  Quitting smoking now greatly reduces serious risk to your health. Obesity, smoking, and sedentary lifestyle greatly increases your risk for illness    A healthy diet, regular physical exercise & weight monitoring are important for maintaining a healthy lifestyle    You may be retaining fluid if you have a history of heart failure or if you experience any of the following symptoms:  Weight gain of 3 pounds or more overnight or 5 pounds in a week, increased swelling in our hands or feet or shortness of breath while lying flat in bed. Please call your doctor as soon as you notice any of these symptoms; do not wait until your next office visit. Recognize signs and symptoms of STROKE:    F-face looks uneven    A-arms unable to move or move unevenly    S-speech slurred or non-existent    T-time-call 911 as soon as signs and symptoms begin-DO NOT go       Back to bed or wait to see if you get better-TIME IS BRAIN. Warning Signs of HEART ATTACK     Call 911 if you have these symptoms:   Chest discomfort. Most heart attacks involve discomfort in the center of the chest that lasts more than a few minutes, or that goes away and comes back. It can feel like uncomfortable pressure, squeezing, fullness, or pain.  Discomfort in other areas of the upper body. Symptoms can include pain or discomfort in one or both arms, the back, neck, jaw, or stomach.  Shortness of breath with or without chest discomfort.  Other signs may include breaking out in a cold sweat, nausea, or lightheadedness. Don't wait more than five minutes to call 911 - MINUTES MATTER! Fast action can save your life. Calling 911 is almost always the fastest way to get lifesaving treatment. Emergency Medical Services staff can begin treatment when they arrive -- up to an hour sooner than if someone gets to the hospital by car. The discharge information has been reviewed with the patient and spouse. The patient and spouse verbalized understanding. Discharge medications reviewed with the patient and spouse and appropriate educational materials and side effects teaching were provided.   ___________________________________________________________________________________________________________________________________

## 2019-04-23 NOTE — PROGRESS NOTES
Pt arrived on unit; Pt Alert and Oriented; Consent signed; SeeMAR and flowsheetsfor sedation report and/or vital signs. Pt transferred to treatment table for procedure.

## 2019-04-23 NOTE — PROGRESS NOTES
Discharge inst given and reviewed with pt and , both verbalize understanding. Pt discharged via wheelchair to car in care of ,  Drain intact. Pt without complaints.  Arm bands removed and shredded

## 2019-04-23 NOTE — H&P
The patient is an appropriate candidate to undergo pelvic abscess drain. Patient assessed immediately prior to induction. Anesthesia plan as follows:   Conscious Sedation. Planned agent(s):  fentanyl and versed    ASA Score:  ASA 2 - Mild systemic disease    History and Physical update:  H&P was reviewed and the patient was examined. No changes have occurred in the patient's condition since the H&P was completed.     Alan Harden MD  Vascular & Interventional Radiology  [facility]  4/23/2019

## 2019-04-23 NOTE — PROCEDURES
Vascular & Interventional Radiology Brief Procedure Note    Interventional Radiologist: Juan Ramon Cordova MD    Pre-operative Diagnosis:  Pelvic fluid collection ? abscess    Post-operative Diagnosis: Same as pre-op dx    Procedure(s) Performed:  CT guided drainage    Anesthesia:  Local and Moderate Sedation    Findings:  Uncomplicated placement of 8F pigtail drain into pelvic fluid collection via left transgluteal approach.  ~80ccs brown cloudy fluid removed. Complications: None    Estimated Blood Loss:  minimal    Tubes and Drains: None    Specimens: None    Condition: Good     Plan: f/u with ordering physician. Routine tube care, flushing. Recc f/u CT in 5-7 days to reassess collection.         Juan Ramon Cordova MD  [facility]  Vascular & Interventional Radiology  4/23/2019

## 2019-04-23 NOTE — PROGRESS NOTES
TRANSFER - OUT REPORT:    Verbal report given to Willam Sharif RN(name) on Angel Burch  being transferred to Care Unit(unit) for routine progression of care       Report consisted of patients Situation, Background, Assessment and   Recommendations(SBAR). Information from the following report(s) Kardex, Procedure Summary and MAR was reviewed with the receiving nurse. Lines:       Opportunity for questions and clarification was provided.       Patient transported with:   Registered Nurse

## 2019-04-23 NOTE — PROGRESS NOTES
Pt back to care unit. Procedure tolerated well. Tegaderm dressing with drain in place to lt buttock area. Amye Blank to bulb in place. Site WNL.

## 2019-04-28 LAB
BACTERIA SPEC CULT: NORMAL
BACTERIA SPEC CULT: NORMAL
GRAM STN SPEC: NORMAL
GRAM STN SPEC: NORMAL
SERVICE CMNT-IMP: NORMAL
SERVICE CMNT-IMP: NORMAL

## 2019-04-29 ENCOUNTER — TELEPHONE (OUTPATIENT)
Dept: ONCOLOGY | Age: 39
End: 2019-04-29

## 2019-04-29 DIAGNOSIS — N70.93 TUBO-OVARIAN ABSCESS: Primary | ICD-10-CM

## 2019-04-29 NOTE — TELEPHONE ENCOUNTER
Patient contacted the office to inquire if she needs a follow up scheduled. Per WINSOME Ortiz she will need a CT prior which the office is working on setting up. WINSOME Ortiz will call her with the details and the follow up will be scheduled once we know the date of the CT. Patient in agreement with plan and had no further questions. She also stated her pain is greatly decreased with treatment recommended by the office.

## 2019-04-29 NOTE — TELEPHONE ENCOUNTER
Returned call to patient with appt for CT, patient usually works on Mondays so was given number to scheduling to r/s scan. Advised to call our office back once scan scheduled to make appt for office f/u with Dr. Kristy Lorenzo. Patient agreeable. Reviewed culture results, NGTD. Patient took one week of Cipro (as this was the max allowed by insurance), I will call insurance this afternoon to f/u on approval for remaining rx. Patient will call pharmacy to check on the status of rx. Patient to call prior to next appt PRN. Patient agreeable. Verified with pharmacy that remainder of rx will be filled today. Will fax [place] medication authorization to [employer], and pharmacy will call patient with update and return call to our office if additional assistance needed.

## 2019-04-30 ENCOUNTER — TELEPHONE (OUTPATIENT)
Dept: ONCOLOGY | Age: 39
End: 2019-04-30

## 2019-04-30 NOTE — TELEPHONE ENCOUNTER
Pharmacist called stating patients insurance authorization was approved for medication. Patient was only covered for a 28 day supply. A higher day supply is needed. Patient is in need of medication per pharmacists.

## 2019-04-30 NOTE — TELEPHONE ENCOUNTER
Confirmed approval with insurance, relayed message to pharmacy who will contact patient when additional 7 days of cipro available for pickup

## 2019-05-06 NOTE — PROGRESS NOTES
[facility]  [address] [place] [address], [address], [address]  [address] [facility], [address] [facility]  [place], [address]   [phone]  Warren Charles DO      Patient ID:  Name:  Hiro Sun  MRN:  [id number]  :  1980/38 y.o. Date:  5/10/2019      HISTORY OF PRESENT ILLNESS:  Hiro Sun is a 34 y.o.  premenopausal female referred by Dr. Hemal Juan for tubo-ovarian abscess. Pt was diagnosed with TOA in January and underwent laparoscopic evaluation. Was then admitted on IV abx x 1 wk and discharged with oral abx x 2 wks. She felt better but then started having increasing pain so she was seen by gyn in 2019 and had an ultrasound with persistent TOA and was placed on oral abx for another week. She then had a CT scan and seen by Dr. Hemal Juan. She was seen in our office 19 and is s/p CT guided drain placement into pelvic fluid collection via left transgluteal approach on 19. She completed additional 2 week course of Cipro/Flagyl. She presents today for follow up and to review recent imaging. She had CT 5/8/10 to reevaluate, which showed resolution of larger collection. Pt feels better. Pain much improved. No fevers/chills    Labs:  None        Imaging  19  CT  FINDINGS:     LOWER CHEST: Within normal limits.     LIVER, BILIARY: Liver is normal. No biliary dilation. Tiny gallstone is present. No signs of cholecystitis.     PANCREAS: Normal.     SPLEEN: Normal.     ADRENALS: Normal.     KIDNEYS: Normal.     LYMPH NODES: No enlarged lymph nodes.     GASTROINTESTINAL TRACT: No bowel dilation or wall thickening. Normal appendix.     PELVIC ORGANS: Left paramedian transgluteal pigtail drainage catheter is present  in the posterior left pelvis. No residual fluid collection around the catheter.   Slightly further cephalad in the left pelvis, 6.2 x 3.8 cm focus of low-density  fluid previously measured 6.9 x 5.4 cm and in the right hemipelvis, 3.5 x 3.1 cm  low-density fluid collection is present measuring 2.9 x 1.7 cm, and was  previously moderately dense, now low density. A couple other smaller rounded  fluid collections are present in the lower pelvis near the midline. Urinary  bladder appears normal.     VASCULATURE: Within normal limits.     BONES: No acute or aggressive osseous abnormalities identified.     OTHER: None.     _______________     IMPRESSION  IMPRESSION:        1. Interval complete drainage of previous left pelvic fluid collection, with  pigtail catheter in situ. 2. Interval diminished size of 6.2 x 3.8 cm (previously 6.9 x 5.4 cm) left mid  pelvic uncomplicated appearing cyst. 3.5 x 3.1 cm right pelvic uncomplicated  cystic appearing collection previously measured 2.9 x 1.7 cm, however, was  previously complicated with internal debris. Other smaller rounded areas of  pelvic fluid are not significantly changed. None of these areas of focal fluid  containing gas, and are not strongly suspicious of tubo-ovarian abscess. 3. Tiny gallstone, no signs of cholecystitis. ROS:   As above      There are no active problems to display for this patient.     Past Medical History:   Diagnosis Date    Dermoid 2017    Tubo-ovarian abscess       Past Surgical History:   Procedure Laterality Date    HX  SECTION      HX  SECTION  , 2006    X2    HX TUBAL LIGATION Bilateral     HX WISDOM TEETH EXTRACTION      upper teeth    LAP,TUBAL CAUTERY        OB History        4    Para   2    Term   2       0    AB   0    Living           SAB   0    TAB   0    Ectopic   0    Molar   0    Multiple        Live Births              Obstetric Comments   C-sections X2           Social History     Tobacco Use    Smoking status: Passive Smoke Exposure - Never Smoker    Smokeless tobacco: Never Used    Tobacco comment:  vapes   Substance Use Topics    Alcohol use: Yes     Comment: \"occassionally\" wine      Family History   Problem Relation Age of Onset    Hypertension Mother     High Cholesterol Mother     Hypertension Father     High Cholesterol Father     Other Father         Pre-diabetic    No Known Problems Brother     Colon Cancer Maternal Aunt     Hypertension Maternal Grandmother     Stroke Maternal Grandmother     Hypertension Maternal Grandfather     Stroke Maternal Grandfather     Colon Cancer Maternal Grandfather     Diabetes Paternal Grandmother     Hypertension Paternal Grandmother     Stroke Paternal Grandmother     Hypertension Paternal Grandfather     Stroke Paternal Grandfather     Diabetes Maternal Grandmother       Current Outpatient Medications   Medication Sig    ciprofloxacin HCl (CIPRO) 250 mg tablet Take  by mouth every twelve (12) hours.  ibuprofen (MOTRIN) 200 mg tablet Take  by mouth as needed for Pain.  fexofenadine HCl (ALLEGRA PO) Take  by mouth as needed for Other (allergies).  fluticasone propionate (FLONASE NA) by Nasal route as needed for Other (allergies).  metroNIDAZOLE (FLAGYL) 500 mg tablet Take 1 Tab by mouth three (3) times daily. No current facility-administered medications for this visit. No Known Allergies       OBJECTIVE:    Physical Exam  VITAL SIGNS: Visit Vitals  /81 (BP 1 Location: Left arm, BP Patient Position: Sitting)   Pulse 77   Temp 96.3 °F (35.7 °C) (Oral)   Resp 16   Ht 5' 4\" (1.626 m)   Wt 64.1 kg (141 lb 6.4 oz)   LMP 04/13/2019   SpO2 97%   BMI 24.27 kg/m²      GENERAL TRELL: in no apparent distress and well developed and well nourished   deferred      IMPRESSION/PLAN:  1. TOA   -s/p IR drain placement   -repeat CT shows improvement   -completed PO cipro/flagyl x 2 weeks   -will plan to remove drain and re-image in 2 months and discuss surgical intervention at that time   -If she starts to have increasing pain/fevers will image sooner   -pt understands and in agreement with plan   -f/u in 2 months      The total time spent was 25 minutes regarding this patients diagnosis of tubo-ovarian abscess and >50% of this time was spent counseling and coordinating care    [facility]  5/10/459905:22 AM

## 2019-05-08 ENCOUNTER — HOSPITAL ENCOUNTER (OUTPATIENT)
Dept: CT IMAGING | Age: 39
Discharge: HOME | End: 2019-05-08
Attending: NURSE PRACTITIONER
Payer: COMMERCIAL

## 2019-05-08 DIAGNOSIS — N70.93 TUBO-OVARIAN ABSCESS: ICD-10-CM

## 2019-05-08 PROCEDURE — 74177 CT ABD & PELVIS W/CONTRAST: CPT

## 2019-05-08 PROCEDURE — 74011636320 HC RX REV CODE- 636/320: Performed by: NURSE PRACTITIONER

## 2019-05-08 PROCEDURE — 74011000255 HC RX REV CODE- 255: Performed by: NURSE PRACTITIONER

## 2019-05-08 RX ORDER — BARIUM SULFATE 20 MG/ML
900 SUSPENSION ORAL
Status: COMPLETED | OUTPATIENT
Start: 2019-05-08 | End: 2019-05-08

## 2019-05-08 RX ADMIN — BARIUM SULFATE 900 ML: 20 SUSPENSION ORAL at 14:11

## 2019-05-08 RX ADMIN — IOPAMIDOL 100 ML: 612 INJECTION, SOLUTION INTRAVENOUS at 14:11

## 2019-05-10 ENCOUNTER — OFFICE VISIT (OUTPATIENT)
Dept: ONCOLOGY | Age: 39
End: 2019-05-10

## 2019-05-10 VITALS
OXYGEN SATURATION: 97 % | BODY MASS INDEX: 24.14 KG/M2 | DIASTOLIC BLOOD PRESSURE: 81 MMHG | RESPIRATION RATE: 16 BRPM | SYSTOLIC BLOOD PRESSURE: 113 MMHG | WEIGHT: 141.4 LBS | HEART RATE: 77 BPM | TEMPERATURE: 96.3 F | HEIGHT: 64 IN

## 2019-05-10 DIAGNOSIS — N70.93 TUBO-OVARIAN ABSCESS: Primary | ICD-10-CM

## 2019-05-10 RX ORDER — CIPROFLOXACIN 250 MG/1
TABLET, FILM COATED ORAL EVERY 12 HOURS
COMMUNITY
End: 2019-07-09 | Stop reason: ALTCHOICE

## 2019-05-10 NOTE — PATIENT INSTRUCTIONS
Tubo-Ovarian Abscess: Care Instructions  Your Care Instructions    A tubo-ovarian abscess is a pocket of pus. It forms because of an infection in a fallopian tube and ovary. A tubo-ovarian abscess is most often caused by pelvic inflammatory disease (PID). Your doctor will prescribe antibiotics to treat the abscess. A very large abscess or one that does not go away after antibiotic treatment may need to be drained. Sometimes surgery is used to remove the infected tube and ovary. Follow-up care is a key part of your treatment and safety. Be sure to make and go to all appointments, and call your doctor if you are having problems. It's also a good idea to know your test results and keep a list of the medicines you take. How can you care for yourself at home? · Take your antibiotics as directed. Do not stop taking them because you feel better. You need to take the full course of antibiotics. · Rest until you feel better. · Take anti-inflammatory medicines to reduce pain. These include ibuprofen (Advil, Motrin) and naproxen (Aleve). Be safe with medicines. Read and follow all instructions on the label. · Use a hot water bottle or a heating pad set on low for belly pain. · Do not have sex or use tampons (use pads instead) until you have taken all the medicine, your pain is gone, and you feel completely well. · Talk to any sex partners you have had in the past 2 months. They need to be tested and may need to be treated for a sexually transmitted disease (STD). When should you call for help? Call your doctor now or seek immediate medical care if:    · You have a new or higher fever.     · You have unusual vaginal bleeding.     · You have new or worse belly or pelvic pain.     · You have vaginal discharge that has increased in amount or smells bad.    Watch closely for changes in your health, and be sure to contact your doctor if:    · You do not get better as expected. Where can you learn more?   Go to http://nena-noreen.info/. Enter V246 in the search box to learn more about \"Tubo-Ovarian Abscess: Care Instructions. \"  Current as of: May 14, 2018  Content Version: 11.9  © 3445-0854 [employer]. Care instructions adapted under license by [employer] (which disclaims liability or warranty for this information). If you have questions about a medical condition or this instruction, always ask your healthcare professional. [address] any warranty or liability for your use of this information.

## 2019-05-10 NOTE — PROGRESS NOTES
Bing Hernadez, a 34 y.o. female,  is here for   Chief Complaint   Patient presents with    Results     review CT scan       Visit Vitals  /81 (BP 1 Location: Left arm, BP Patient Position: Sitting)   Pulse 77   Temp 96.3 °F (35.7 °C) (Oral)   Resp 16   Ht 5' 4\" (1.626 m)   Wt 64.1 kg (141 lb 6.4 oz)   SpO2 97%   BMI 24.27 kg/m²       Patient reports current pain where her drain was placed, rated 2 out of 10. Is no longer taking Flagyl, Is taking Cipro. Patient denies any persistent or worsening abdominal or pelvic pain. Denies any unusual vaginal bleeding, discharge, irritation, or odor. Denies experiencing any constipation or diarrhea. No burning, discomfort, or irritation with urination. 1. Have you been to the ER, urgent care clinic since your last visit? Hospitalized since your last visit? No    2. Have you seen or consulted any other health care providers outside of the [address] [place] [address] since your last visit? Include any pap smears or colon screening.  No

## 2019-05-13 ENCOUNTER — DOCUMENTATION ONLY (OUTPATIENT)
Dept: ONCOLOGY | Age: 39
End: 2019-05-13

## 2019-05-13 ENCOUNTER — TELEPHONE (OUTPATIENT)
Dept: ONCOLOGY | Age: 39
End: 2019-05-13

## 2019-05-13 NOTE — PROGRESS NOTES
Spoke with Aguilar Mendoza in [address] [place] [address], will check on removal this week and return call to my desk line

## 2019-05-13 NOTE — TELEPHONE ENCOUNTER
Patient called the office to check the status of an appointment NP [facility] is scheduling. Relayed that the office is still working on setting up the appointment, but she will receive a call once it is scheduled. Patient is agreeable with this plan. Please call her at [phone] once appointment details are available.

## 2019-05-14 ENCOUNTER — OFFICE VISIT (OUTPATIENT)
Dept: ONCOLOGY | Age: 39
End: 2019-05-14

## 2019-05-14 VITALS
HEIGHT: 64 IN | HEART RATE: 69 BPM | TEMPERATURE: 96.9 F | RESPIRATION RATE: 14 BRPM | OXYGEN SATURATION: 99 % | BODY MASS INDEX: 24.24 KG/M2 | WEIGHT: 142 LBS | SYSTOLIC BLOOD PRESSURE: 111 MMHG | DIASTOLIC BLOOD PRESSURE: 78 MMHG

## 2019-05-14 DIAGNOSIS — N70.93 TUBO-OVARIAN ABSCESS: Primary | ICD-10-CM

## 2019-05-14 NOTE — PROGRESS NOTES
Mis Arredondo, a 34 y.o. female,  is here for   Chief Complaint   Patient presents with    Other     drain removal       Visit Vitals  /78 (BP 1 Location: Left arm, BP Patient Position: Sitting)   Pulse 69   Temp 96.9 °F (36.1 °C) (Oral)   Resp 14   Ht 5' 4\" (1.626 m)   Wt 64.4 kg (142 lb)   SpO2 99%   BMI 24.37 kg/m²       PaPatient denies any persistent or worsening abdominal or pelvic pain. Denies any unusual vaginal bleeding, discharge, irritation, or odor. Denies experiencing any constipation or diarrhea. No burning, discomfort, or irritation with urination. 1. Have you been to the ER, urgent care clinic since your last visit? Hospitalized since your last visit? No    2. Have you seen or consulted any other health care providers outside of the [facility] since your last visit? Include any pap smears or colon screening.  No

## 2019-05-14 NOTE — PATIENT INSTRUCTIONS
Wound Care: After Your Visit  Your Care Instructions  Taking good care of your wound at home will help it heal quickly and reduce your chance of infection. The doctor has checked you carefully, but problems can develop later. If you notice any problems or new symptoms, get medical treatment right away. Follow-up care is a key part of your treatment and safety. Be sure to make and go to all appointments, and call your doctor if you are having problems. It's also a good idea to know your test results and keep a list of the medicines you take. How can you care for yourself at home? · Clean the area with soap and water 2 times a day unless your doctor gives you different instructions. Don't use hydrogen peroxide or alcohol, which can slow healing. ¨ You may cover the wound with a thin layer of antibiotic ointment, such as bacitracin, and a nonstick bandage. ¨ Apply more ointment and replace the bandage as needed. · Take pain medicines exactly as directed. Some pain is normal with a wound, but do not ignore pain that is getting worse instead of better. You could have an infection. ¨ If the doctor gave you a prescription medicine for pain, take it as prescribed. ¨ If you are not taking a prescription pain medicine, ask your doctor if you can take an over-the-counter medicine. · Your doctor may have closed your wound with stitches (sutures), staples, or skin glue. ¨ If you have stitches, your doctor may remove them after several days to 2 weeks. Or you may have stitches that dissolve on their own. ¨ If you have staples, your doctor may remove them after 7 to 10 days. ¨ If your wound was closed with skin glue, the glue will wear off in a few days to 2 weeks. When should you call for help? Call your doctor now or seek immediate medical care if:  · You have signs of infection, such as:  ¨ Increased pain, swelling, warmth, or redness near the wound. ¨ Red streaks leading from the wound.   ¨ Pus draining from the wound. ¨ A fever. · You bleed so much from your incision that you soak one or more bandages over 2 to 4 hours. Watch closely for changes in your health, and be sure to contact your doctor if:  · The wound is not getting better each day. Where can you learn more? Go to Decurate.be  Enter M973 in the search box to learn more about \"Wound Care: After Your Visit. \"   © 8259-3595 [employer]wise, Incorporated. Care instructions adapted under license by [facility] (which disclaims liability or warranty for this information). This care instruction is for use with your licensed healthcare professional. If you have questions about a medical condition or this instruction, always ask your healthcare professional. [address] any warranty or liability for your use of this information. Content Version: 15.5.682864;  Last Revised: April 23, 2012

## 2019-05-14 NOTE — PROGRESS NOTES
[address], [address], [address] [facility]  [address] [facility] [place], [address] [place] [address]  [place], [address]   [phone]  Clotilde Galan DO      Patient ID:  Name:  Marianne Briones  MRN:  [id number]  :  1980/38 y.o. Date:  2019      HISTORY OF PRESENT ILLNESS:  Marianne Briones is a 34 y.o.  premenopausal female referred by Dr. Mirna Velasco for tubo-ovarian abscess. Pt was diagnosed with TOA in January and underwent laparoscopic evaluation. Was then admitted on IV abx x 1 wk and discharged with oral abx x 2 wks. She felt better but then started having increasing pain so she was seen by gyn in 2019 and had an ultrasound with persistent TOA and was placed on oral abx for another week. She then had a CT scan and seen by Dr. Mirna Velasco. She was seen in our office 19 and is s/p CT guided drain placement into pelvic fluid collection via left transgluteal approach on 19. She completed additional 2 week course of Cipro/Flagyl. Here to have drain removed. Labs:  None        Imaging  19  CT  FINDINGS:     LOWER CHEST: Within normal limits.     LIVER, BILIARY: Liver is normal. No biliary dilation. Tiny gallstone is present. No signs of cholecystitis.     PANCREAS: Normal.     SPLEEN: Normal.     ADRENALS: Normal.     KIDNEYS: Normal.     LYMPH NODES: No enlarged lymph nodes.     GASTROINTESTINAL TRACT: No bowel dilation or wall thickening. Normal appendix.     PELVIC ORGANS: Left paramedian transgluteal pigtail drainage catheter is present  in the posterior left pelvis. No residual fluid collection around the catheter. Slightly further cephalad in the left pelvis, 6.2 x 3.8 cm focus of low-density  fluid previously measured 6.9 x 5.4 cm and in the right hemipelvis, 3.5 x 3.1 cm  low-density fluid collection is present measuring 2.9 x 1.7 cm, and was  previously moderately dense, now low density.  A couple other smaller rounded  fluid collections are present in the lower pelvis near the midline. Urinary  bladder appears normal.     VASCULATURE: Within normal limits.     BONES: No acute or aggressive osseous abnormalities identified.     OTHER: None.     _______________     IMPRESSION  IMPRESSION:        1. Interval complete drainage of previous left pelvic fluid collection, with  pigtail catheter in situ. 2. Interval diminished size of 6.2 x 3.8 cm (previously 6.9 x 5.4 cm) left mid  pelvic uncomplicated appearing cyst. 3.5 x 3.1 cm right pelvic uncomplicated  cystic appearing collection previously measured 2.9 x 1.7 cm, however, was  previously complicated with internal debris. Other smaller rounded areas of  pelvic fluid are not significantly changed. None of these areas of focal fluid  containing gas, and are not strongly suspicious of tubo-ovarian abscess. 3. Tiny gallstone, no signs of cholecystitis. ROS:   As above      There are no active problems to display for this patient.     Past Medical History:   Diagnosis Date    Dermoid 2017    Tubo-ovarian abscess       Past Surgical History:   Procedure Laterality Date    HX  SECTION      HX  SECTION  , 2006    X2    HX TUBAL LIGATION Bilateral     HX WISDOM TEETH EXTRACTION      upper teeth    LAP,TUBAL CAUTERY        OB History        4    Para   2    Term   2       0    AB   0    Living           SAB   0    TAB   0    Ectopic   0    Molar   0    Multiple        Live Births              Obstetric Comments   C-sections X2           Social History     Tobacco Use    Smoking status: Passive Smoke Exposure - Never Smoker    Smokeless tobacco: Never Used    Tobacco comment:  vapes   Substance Use Topics    Alcohol use: Yes     Comment: \"occassionally\" wine      Family History   Problem Relation Age of Onset    Hypertension Mother     High Cholesterol Mother     Hypertension Father     High Cholesterol Father     Other Father         Pre-diabetic    No Known Problems Brother     Colon Cancer Maternal Aunt     Hypertension Maternal Grandmother     Stroke Maternal Grandmother     Hypertension Maternal Grandfather     Stroke Maternal Grandfather     Colon Cancer Maternal Grandfather     Diabetes Paternal Grandmother     Hypertension Paternal Grandmother     Stroke Paternal Grandmother     Hypertension Paternal Grandfather     Stroke Paternal Grandfather     Diabetes Maternal Grandmother       Current Outpatient Medications   Medication Sig    ciprofloxacin HCl (CIPRO) 250 mg tablet Take  by mouth every twelve (12) hours.  ibuprofen (MOTRIN) 200 mg tablet Take  by mouth as needed for Pain.  fexofenadine HCl (ALLEGRA PO) Take  by mouth as needed for Other (allergies).  fluticasone propionate (FLONASE NA) by Nasal route as needed for Other (allergies). No current facility-administered medications for this visit. No Known Allergies       OBJECTIVE:    Physical Exam  VITAL SIGNS: Visit Vitals  /78 (BP 1 Location: Left arm, BP Patient Position: Sitting)   Pulse 69   Temp 96.9 °F (36.1 °C) (Oral)   Resp 14   Ht 5' 4\" (1.626 m)   Wt 64.4 kg (142 lb)   LMP 04/15/2019 (Within Days)   SpO2 99%   BMI 24.37 kg/m²      GENERAL TRELL: in no apparent distress and well developed and well nourished   Drain removed. Band aid applied      IMPRESSION/PLAN:  1. TOA   -s/p IR drain placement   -repeat CT shows improvement   -completed PO cipro/flagyl x 2 weeks   -Drain removed today   -will plan re-image in 2 months and discuss surgical intervention at that time   -If she starts to have increasing pain/fevers will image sooner   -pt understands and in agreement with plan   -f/u in 2 months      The total time spent was 25 minutes regarding this patients diagnosis of tubo-ovarian abscess and >50% of this time was spent counseling and coordinating care    [address] [facility]  5/14/201910:22 AM

## 2019-05-26 LAB
BACTERIA SPEC CULT: NORMAL
FUNGUS SMEAR,FNGSMR: NORMAL
SERVICE CMNT-IMP: NORMAL

## 2019-07-02 ENCOUNTER — HOSPITAL ENCOUNTER (OUTPATIENT)
Dept: ULTRASOUND IMAGING | Age: 39
Discharge: HOME | End: 2019-07-02
Attending: OBSTETRICS & GYNECOLOGY
Payer: COMMERCIAL

## 2019-07-02 DIAGNOSIS — N70.93 TUBO-OVARIAN ABSCESS: ICD-10-CM

## 2019-07-02 PROCEDURE — 93975 VASCULAR STUDY: CPT

## 2019-07-09 ENCOUNTER — OFFICE VISIT (OUTPATIENT)
Dept: ONCOLOGY | Age: 39
End: 2019-07-09

## 2019-07-09 VITALS
RESPIRATION RATE: 16 BRPM | DIASTOLIC BLOOD PRESSURE: 78 MMHG | OXYGEN SATURATION: 99 % | WEIGHT: 143.2 LBS | HEIGHT: 64 IN | HEART RATE: 66 BPM | SYSTOLIC BLOOD PRESSURE: 125 MMHG | BODY MASS INDEX: 24.45 KG/M2 | TEMPERATURE: 97.8 F

## 2019-07-09 DIAGNOSIS — N70.93 TUBO-OVARIAN ABSCESS: Primary | ICD-10-CM

## 2019-07-09 NOTE — PROGRESS NOTES
Shelley Brown, a 34 y.o. female,  is here for   Chief Complaint   Patient presents with    Results     review TVUS       Visit Vitals  /78 (BP 1 Location: Left arm, BP Patient Position: Sitting)   Pulse 66   Temp 97.8 °F (36.6 °C) (Oral)   Resp 16   Ht 5' 4\" (1.626 m)   Wt 65 kg (143 lb 3.2 oz)   SpO2 99%   BMI 24.58 kg/m²       Patient denies any persistent or worsening abdominal or pelvic pain. Denies any unusual vaginal bleeding, discharge, irritation, or odor. Denies experiencing any constipation or diarrhea. No burning, discomfort, or irritation with urination. 1. Have you been to the ER, urgent care clinic since your last visit? Hospitalized since your last visit? No    2. Have you seen or consulted any other health care providers outside of the [address] [place] [address] since your last visit? Include any pap smears or colon screening.  No

## 2019-07-09 NOTE — PATIENT INSTRUCTIONS
Tubo-Ovarian Abscess: Care Instructions  Your Care Instructions    A tubo-ovarian abscess is a pocket of pus. It forms because of an infection in a fallopian tube and ovary. A tubo-ovarian abscess is most often caused by pelvic inflammatory disease (PID). Your doctor will prescribe antibiotics to treat the abscess. A very large abscess or one that does not go away after antibiotic treatment may need to be drained. Sometimes surgery is used to remove the infected tube and ovary. Follow-up care is a key part of your treatment and safety. Be sure to make and go to all appointments, and call your doctor if you are having problems. It's also a good idea to know your test results and keep a list of the medicines you take. How can you care for yourself at home? · Take your antibiotics as directed. Do not stop taking them because you feel better. You need to take the full course of antibiotics. · Rest until you feel better. · Take anti-inflammatory medicines to reduce pain. These include ibuprofen (Advil, Motrin) and naproxen (Aleve). Be safe with medicines. Read and follow all instructions on the label. · Use a hot water bottle or a heating pad set on low for belly pain. · Do not have sex or use tampons (use pads instead) until you have taken all the medicine, your pain is gone, and you feel completely well. · Talk to any sex partners you have had in the past 2 months. They need to be tested and may need to be treated for a sexually transmitted disease (STD). When should you call for help? Call your doctor now or seek immediate medical care if:    · You have a new or higher fever.     · You have unusual vaginal bleeding.     · You have new or worse belly or pelvic pain.     · You have vaginal discharge that has increased in amount or smells bad.    Watch closely for changes in your health, and be sure to contact your doctor if:    · You do not get better as expected. Where can you learn more?   Go to http://nena-noreen.info/. Enter V246 in the search box to learn more about \"Tubo-Ovarian Abscess: Care Instructions. \"  Current as of: May 14, 2018  Content Version: 11.9  © 2861-1232 [employer]. Care instructions adapted under license by [employer] (which disclaims liability or warranty for this information). If you have questions about a medical condition or this instruction, always ask your healthcare professional. [address] any warranty or liability for your use of this information.

## 2019-07-09 NOTE — PROGRESS NOTES
1263 [facility]  [address] [place] [address], [address], [address]  [address] [facility], [address] [place] [address]  [place], [address]   [phone]  Peggy Darnell DO      Patient ID:  Name:  Clary Torres  MRN:  [id number]  :  1980/38 y.o. Date:  2019      HISTORY OF PRESENT ILLNESS:  Clary Torres is a 34 y.o.  premenopausal female referred by Dr. Avani Loredo for tubo-ovarian abscess. Pt was diagnosed with TOA in January and underwent laparoscopic evaluation. Was then admitted on IV abx x 1 wk and discharged with oral abx x 2 wks. She felt better but then started having increasing pain so she was seen by gyn in 2019 and had an ultrasound with persistent TOA and was placed on oral abx for another week. She then had a CT scan and seen by Dr. Avani Loredo. She was seen in our office 19 and is s/p CT guided drain placement into pelvic fluid collection via left transgluteal approach on 19. She completed additional 2 week course of Cipro/Flagyl and had imaging with improvement and drains removed in may. Here to review most recent imaging. Pt has no complaints. Pain has completely resolved. Denies fevers. Labs:  None        Imaging  TVUS 2019  FINDINGS:      Uterus: The uterus is normal in size. The uterus measured 10.4 x 4.7 x 7.0 cm. The posterior uterine wall demonstrated a focal oval area of hypoechogenicity  measuring 1.4 x 0.7 x 1.5 cm. This likely represents a leiomyoma. .  The  endometrial stripe was normal in thickness and measured 16 mm in double wall  thickness.       Ovaries: Both ovaries are identified. The right ovary measured 6.9 x 3.3 x 4.5  cm. The left ovary measured 5.0 x 4.0 x 4.9 cm. Multiple right ovarian  follicles are present. The largest demonstrates a diameter of 2.6 cm. The left  ovary shows a focal hypoechoic structure measuring 4.5 x 3.0 x 3.4 cm. This does  not demonstrate internal flow.  This may represent a cyst with debris or  hemorrhage. Bilateral ovarian vascular flow is demonstrated.       Other: An area of free fluid is noted between the right and left ovaries  posterior to the uterus. Some of this may be related to a large left ovarian  cyst on the left that was noted on the prior abdominal CT     _______________     IMPRESSION  IMPRESSION:     Bilateral ovarian cysts are present. A left ovarian cyst demonstrates  homogeneous hypoechoic material which could represent either proteinaceous  material/debris or hemorrhage. A small amount of fluid is noted in the pelvis. An anechoic cystic area seen posterior to the uterus which could correlate with  the large 6 cm cystic structure on recent abdominal CT     Small focal hypoechoic area within uterine wall which is likely a leiomyoma  5/8/19  CT  FINDINGS:     LOWER CHEST: Within normal limits.     LIVER, BILIARY: Liver is normal. No biliary dilation. Tiny gallstone is present. No signs of cholecystitis.     PANCREAS: Normal.     SPLEEN: Normal.     ADRENALS: Normal.     KIDNEYS: Normal.     LYMPH NODES: No enlarged lymph nodes.     GASTROINTESTINAL TRACT: No bowel dilation or wall thickening. Normal appendix.     PELVIC ORGANS: Left paramedian transgluteal pigtail drainage catheter is present  in the posterior left pelvis. No residual fluid collection around the catheter. Slightly further cephalad in the left pelvis, 6.2 x 3.8 cm focus of low-density  fluid previously measured 6.9 x 5.4 cm and in the right hemipelvis, 3.5 x 3.1 cm  low-density fluid collection is present measuring 2.9 x 1.7 cm, and was  previously moderately dense, now low density. A couple other smaller rounded  fluid collections are present in the lower pelvis near the midline.  Urinary  bladder appears normal.     VASCULATURE: Within normal limits.     BONES: No acute or aggressive osseous abnormalities identified.     OTHER: None.     _______________     IMPRESSION  IMPRESSION:        1. Interval complete drainage of previous left pelvic fluid collection, with  pigtail catheter in situ. 2. Interval diminished size of 6.2 x 3.8 cm (previously 6.9 x 5.4 cm) left mid  pelvic uncomplicated appearing cyst. 3.5 x 3.1 cm right pelvic uncomplicated  cystic appearing collection previously measured 2.9 x 1.7 cm, however, was  previously complicated with internal debris. Other smaller rounded areas of  pelvic fluid are not significantly changed. None of these areas of focal fluid  containing gas, and are not strongly suspicious of tubo-ovarian abscess. 3. Tiny gallstone, no signs of cholecystitis. ROS:   As above      There are no active problems to display for this patient.     Past Medical History:   Diagnosis Date    Dermoid 2017    Tubo-ovarian abscess       Past Surgical History:   Procedure Laterality Date    HX  SECTION      HX  SECTION  , 2006    X2    HX TUBAL LIGATION Bilateral     HX WISDOM TEETH EXTRACTION      upper teeth    LAP,TUBAL CAUTERY        OB History        4    Para   2    Term   2       0    AB   0    Living           SAB   0    TAB   0    Ectopic   0    Molar   0    Multiple        Live Births              Obstetric Comments   C-sections X2           Social History     Tobacco Use    Smoking status: Passive Smoke Exposure - Never Smoker    Smokeless tobacco: Never Used    Tobacco comment:  vapes   Substance Use Topics    Alcohol use: Yes     Comment: \"occassionally\" wine      Family History   Problem Relation Age of Onset    Hypertension Mother     High Cholesterol Mother     Hypertension Father     High Cholesterol Father     Other Father         Pre-diabetic    No Known Problems Brother     Colon Cancer Maternal Aunt     Hypertension Maternal Grandmother     Stroke Maternal Grandmother     Hypertension Maternal Grandfather     Stroke Maternal Grandfather     Colon Cancer Maternal Grandfather     Diabetes Paternal Grandmother     Hypertension Paternal Grandmother     Stroke Paternal Grandmother     Hypertension Paternal Grandfather     Stroke Paternal Grandfather     Diabetes Maternal Grandmother       Current Outpatient Medications   Medication Sig    ibuprofen (MOTRIN) 200 mg tablet Take  by mouth as needed for Pain.  fexofenadine HCl (ALLEGRA PO) Take  by mouth as needed for Other (allergies).  fluticasone propionate (FLONASE NA) by Nasal route as needed for Other (allergies). No current facility-administered medications for this visit. No Known Allergies       OBJECTIVE:    Physical Exam  VITAL SIGNS: Visit Vitals  /78 (BP 1 Location: Left arm, BP Patient Position: Sitting)   Pulse 66   Temp 97.8 °F (36.6 °C) (Oral)   Resp 16   Ht 5' 4\" (1.626 m)   Wt 65 kg (143 lb 3.2 oz)   LMP 06/15/2019 (Within Days)   SpO2 99%   BMI 24.58 kg/m²      GENERAL TRELL: in no apparent distress and well developed and well nourished         IMPRESSION/PLAN:  1. TOA   -s/p IR drain placement   -completed PO cipro/flagyl x 2 weeks   -reviewed ultrasound with no obvious TOA.  Has some benign appearing ovarian cyst.   -will repeat ultrasound in 6 months and if resolved or stable pt can f/u with primary gyn  ` -instructed her to call if pain returns    -pt understands and in agreement with plan   -f/u in 6 months      The total time spent was 25 minutes regarding this patients diagnosis of tubo-ovarian abscess and >50% of this time was spent counseling and coordinating care    [address] [place] [address]  Gynecologic Oncology  7/9/201910:22 AM

## 2020-07-06 ENCOUNTER — TELEPHONE (OUTPATIENT)
Dept: ONCOLOGY | Age: 40
End: 2020-07-06

## 2020-07-06 NOTE — TELEPHONE ENCOUNTER
Left voicemail for patient to call central scheduling to schedule her ultrasound and to contact the office once it is scheduled to schedule her follow up appointment.

## 2020-07-14 ENCOUNTER — HOSPITAL ENCOUNTER (OUTPATIENT)
Dept: ULTRASOUND IMAGING | Age: 40
Discharge: HOME | End: 2020-07-14
Attending: OBSTETRICS & GYNECOLOGY
Payer: COMMERCIAL

## 2020-07-14 DIAGNOSIS — N70.93 TUBO-OVARIAN ABSCESS: ICD-10-CM

## 2020-07-14 PROCEDURE — 76856 US EXAM PELVIC COMPLETE: CPT

## 2020-07-23 ENCOUNTER — TELEPHONE (OUTPATIENT)
Dept: ONCOLOGY | Age: 40
End: 2020-07-23

## 2020-07-23 NOTE — TELEPHONE ENCOUNTER
Left message advising patient to contact the office regarding appointment on [phone]. Wanted to know if patient could be seen on the same day at a later time. Office number was left on patients voicemail.

## 2020-07-28 ENCOUNTER — OFFICE VISIT (OUTPATIENT)
Dept: ONCOLOGY | Age: 40
End: 2020-07-28

## 2020-07-28 VITALS
OXYGEN SATURATION: 100 % | WEIGHT: 146 LBS | RESPIRATION RATE: 13 BRPM | HEIGHT: 64 IN | BODY MASS INDEX: 24.92 KG/M2 | HEART RATE: 61 BPM | TEMPERATURE: 98.3 F | SYSTOLIC BLOOD PRESSURE: 142 MMHG | DIASTOLIC BLOOD PRESSURE: 74 MMHG

## 2020-07-28 DIAGNOSIS — N70.93 TUBO-OVARIAN ABSCESS: Primary | ICD-10-CM

## 2020-07-28 RX ORDER — PSEUDOEPHEDRINE HCL 30 MG
TABLET ORAL
COMMUNITY

## 2020-07-28 NOTE — PROGRESS NOTES
[address], [address], [address] [facility]  [address] [facility], [address] [place] [address]  [place], [address]   [phone]  Teresa Duomnt DO      Patient ID:  Name:  Lakeshia Johnson  MRN:  [id number]  :  1980/39 y.o. Date:  2020      HISTORY OF PRESENT ILLNESS:  Lakeshia Johnson is a 37 y.o.  premenopausal female referred by Dr. Saniya Caruso for tubo-ovarian abscess. Pt was diagnosed with TOA in January and underwent laparoscopic evaluation. Was then admitted on IV abx x 1 wk and discharged with oral abx x 2 wks. She felt better but then started having increasing pain so she was seen by gyn in 2019 and had an ultrasound with persistent TOA and was placed on oral abx for another week. She then had a CT scan and seen by Dr. Saniya Caruso. She was seen in our office 19 and is s/p CT guided drain placement into pelvic fluid collection via left transgluteal approach on 19. Pt feels well. No fevers. Labs:  None        Imaging  US pelvis 2020  FINDINGS:     UTERUS: Anteverted    > Size: 10.1 x 5.0 x 6.0 cm    > Masses:   Lesion 1- Hypoechoic mass near the lower uterine segment/cervix measures 1.6 x  0.8 x 1.6 cm, previously 1.4 x 0.7 x 1.5 cm (ultrasound 2019). Lesion 2 -anechoic cyst in the region of the cervix measures 0.6 x 0.3 x 0.6 cm,  likely nabothian cyst.     > Endometrium: Measures up to 1.5 cm      RIGHT ADNEXA:     > Right ovary is enlarged measuring 5.6 x 3.4 x 5.6 cm. Color and spectral  Doppler demonstrate normal flow to the right ovary with no evidence of ovarian  torsion. Arterial and venous waveforms are normal.    > Anechoic cystic structure in the right ovary measures 5.2 x 2.5 x 5.6 cm. Complex cystic structure extends from the right ovary into the adnexa and  measures 2.7 x 1.6 x 2.3 cm.     LEFT ADNEXA:    > The left ovary is borderline enlarged measuring 4.5 x 2.7 x 3.2.  Color and  spectral Doppler demonstrate normal flow to the right ovary with no evidence of  ovarian torsion. Arterial and venous waveforms are normal.    > Hypoechoic structure in the left ovary measures 2.1 x 1.4 x 1.9 cm.     OTHER: Complex fluid is seen in the cul-de-sac with several septations noted.     Suboptimal exam due to bowel gas .     _______________     IMPRESSION  IMPRESSION:     1. Complex fluid extending from the right ovary into the right adnexa with  internal septations may represent complex fluid within the fallopian tube (as  may be seen in tubo-ovarian abscess) versus a complex/complicated paraovarian  cyst or inclusion cyst. Contrast-enhanced MRI may be used for further  characterization as clinically indicated. 2.  Arterial and venous blood flow seen in both ovaries however given mildly  enlarged ovarian size, intermittent torsion/detorsion cannot be excluded. 3.  Hypoechoic mass in the inferior uterus/cervix appears similar to 2019 and  may represent a fibroid. TVUS 7/2/2019  FINDINGS:      Uterus: The uterus is normal in size. The uterus measured 10.4 x 4.7 x 7.0 cm. The posterior uterine wall demonstrated a focal oval area of hypoechogenicity  measuring 1.4 x 0.7 x 1.5 cm. This likely represents a leiomyoma. .  The  endometrial stripe was normal in thickness and measured 16 mm in double wall  thickness.       Ovaries: Both ovaries are identified. The right ovary measured 6.9 x 3.3 x 4.5  cm. The left ovary measured 5.0 x 4.0 x 4.9 cm. Multiple right ovarian  follicles are present. The largest demonstrates a diameter of 2.6 cm. The left  ovary shows a focal hypoechoic structure measuring 4.5 x 3.0 x 3.4 cm. This does  not demonstrate internal flow. This may represent a cyst with debris or  hemorrhage. Bilateral ovarian vascular flow is demonstrated.       Other: An area of free fluid is noted between the right and left ovaries  posterior to the uterus.  Some of this may be related to a large left ovarian  cyst on the left that was noted on the prior abdominal CT     _______________     IMPRESSION  IMPRESSION:     Bilateral ovarian cysts are present. A left ovarian cyst demonstrates  homogeneous hypoechoic material which could represent either proteinaceous  material/debris or hemorrhage. A small amount of fluid is noted in the pelvis. An anechoic cystic area seen posterior to the uterus which could correlate with  the large 6 cm cystic structure on recent abdominal CT     Small focal hypoechoic area within uterine wall which is likely a leiomyoma  5/8/19  CT  FINDINGS:     LOWER CHEST: Within normal limits.     LIVER, BILIARY: Liver is normal. No biliary dilation. Tiny gallstone is present. No signs of cholecystitis.     PANCREAS: Normal.     SPLEEN: Normal.     ADRENALS: Normal.     KIDNEYS: Normal.     LYMPH NODES: No enlarged lymph nodes.     GASTROINTESTINAL TRACT: No bowel dilation or wall thickening. Normal appendix.     PELVIC ORGANS: Left paramedian transgluteal pigtail drainage catheter is present  in the posterior left pelvis. No residual fluid collection around the catheter. Slightly further cephalad in the left pelvis, 6.2 x 3.8 cm focus of low-density  fluid previously measured 6.9 x 5.4 cm and in the right hemipelvis, 3.5 x 3.1 cm  low-density fluid collection is present measuring 2.9 x 1.7 cm, and was  previously moderately dense, now low density. A couple other smaller rounded  fluid collections are present in the lower pelvis near the midline. Urinary  bladder appears normal.     VASCULATURE: Within normal limits.     BONES: No acute or aggressive osseous abnormalities identified.     OTHER: None.     _______________     IMPRESSION  IMPRESSION:        1. Interval complete drainage of previous left pelvic fluid collection, with  pigtail catheter in situ.    2. Interval diminished size of 6.2 x 3.8 cm (previously 6.9 x 5.4 cm) left mid  pelvic uncomplicated appearing cyst. 3.5 x 3.1 cm right pelvic uncomplicated  cystic appearing collection previously measured 2.9 x 1.7 cm, however, was  previously complicated with internal debris. Other smaller rounded areas of  pelvic fluid are not significantly changed. None of these areas of focal fluid  containing gas, and are not strongly suspicious of tubo-ovarian abscess. 3. Tiny gallstone, no signs of cholecystitis. ROS:   As above      There are no active problems to display for this patient.     Past Medical History:   Diagnosis Date    Dermoid 2017    Tubo-ovarian abscess       Past Surgical History:   Procedure Laterality Date    HX  SECTION      HX  SECTION  , 2006    X2    HX TUBAL LIGATION Bilateral 2006    HX WISDOM TEETH EXTRACTION      upper teeth    LAP,TUBAL CAUTERY        OB History        4    Para   2    Term   2       0    AB   0    Living           SAB   0    TAB   0    Ectopic   0    Molar   0    Multiple        Live Births              Obstetric Comments   C-sections X2           Social History     Tobacco Use    Smoking status: Passive Smoke Exposure - Never Smoker    Smokeless tobacco: Never Used    Tobacco comment:  vapes   Substance Use Topics    Alcohol use: Yes     Comment: \"occassionally\" wine      Family History   Problem Relation Age of Onset    Hypertension Mother     High Cholesterol Mother     Hypertension Father     High Cholesterol Father     Other Father         Pre-diabetic    No Known Problems Brother     Colon Cancer Maternal Aunt     Hypertension Maternal Grandmother     Stroke Maternal Grandmother     Hypertension Maternal Grandfather     Stroke Maternal Grandfather     Colon Cancer Maternal Grandfather     Diabetes Paternal Grandmother     Hypertension Paternal Grandmother     Stroke Paternal Grandmother     Hypertension Paternal Grandfather     Stroke Paternal Grandfather     Diabetes Maternal Grandmother       Current Outpatient Medications   Medication Sig    ibuprofen (MOTRIN) 200 mg tablet Take  by mouth as needed for Pain.  fexofenadine HCl (ALLEGRA PO) Take  by mouth as needed for Other (allergies).  fluticasone propionate (FLONASE NA) by Nasal route as needed for Other (allergies). No current facility-administered medications for this visit. No Known Allergies       OBJECTIVE:    Physical Exam  VITAL SIGNS: Visit Vitals  Ht 5' 4\" (1.626 m)   Wt 66.2 kg (146 lb)   BMI 25.06 kg/m²      GENERAL TRELL: in no apparent distress and well developed and well nourished         IMPRESSION/PLAN:  1. TOA   -s/p IR drain placement   -completed PO cipro/flagyl x 2 weeks   -reviewed ultrasound with persistent mass in right ovary   -recommend proceeding with right salpingo-oophorectomy   -discussed robotic approach   -surgery to be scheduled in sept at [facility]      The total time spent was 25 minutes regarding this patients diagnosis of tubo-ovarian abscess and >50% of this time was spent counseling and coordinating care    [address] [place] [address] Oncology  7/28/202010:22 AM

## 2020-07-28 NOTE — PROGRESS NOTES
Aniket Jeffers is a 37 y.o. female presents in office for follow up, results. Chief Complaint   Patient presents with    Results        Do you have any unusual vaginal bleeding, discharge or irritation? No  Do you have any changes in your bowel movements? No  Have you been experiencing nausea or vomiting? No  Have you been experiencing any continuous or worsening abdominal pain? No  Any urinary burning? No    Visit Vitals  /74 (BP 1 Location: Right arm, BP Patient Position: Sitting)   Pulse 61   Temp 98.3 °F (36.8 °C) (Oral)   Resp 13   Ht 5' 4\" (1.626 m)   Wt 66.2 kg (146 lb)   SpO2 100%   BMI 25.06 kg/m²         Health Maintenance Due   Topic Date Due    DTaP/Tdap/Td series (1 - Tdap) 10/08/1999    Lipid Screen  10/08/2018    PAP AKA CERVICAL CYTOLOGY  10/25/2019         1. Have you been to the ER, urgent care clinic since your last visit? Hospitalized since your last visit? No    2. Have you seen or consulted any other health care providers outside of the [address] [place] [address] since your last visit? Include any pap smears or colon screening.  No    3 most recent PHQ Screens 4/18/2019   Little interest or pleasure in doing things Not at all   Feeling down, depressed, irritable, or hopeless Not at all   Total Score PHQ 2 0       Learning Assessment 4/18/2019   PRIMARY LEARNER Patient   HIGHEST LEVEL OF EDUCATION - PRIMARY LEARNER  > 4 YEARS OF COLLEGE   BARRIERS PRIMARY LEARNER NONE   CO-LEARNER CAREGIVER No   PRIMARY LANGUAGE ENGLISH   LEARNER PREFERENCE PRIMARY OTHER (COMMENT)   ANSWERED BY patient   RELATIONSHIP SELF

## 2020-10-08 ENCOUNTER — TRANSCRIBE ORDER (OUTPATIENT)
Dept: REGISTRATION | Age: 40
End: 2020-10-08

## 2020-10-08 ENCOUNTER — HOSPITAL ENCOUNTER (OUTPATIENT)
Dept: LAB | Age: 40
Discharge: HOME | End: 2020-10-08

## 2020-10-08 ENCOUNTER — HOSPITAL ENCOUNTER (OUTPATIENT)
Dept: PREADMISSION TESTING | Age: 40
Discharge: HOME | End: 2020-10-08

## 2020-10-08 DIAGNOSIS — N70.93 PYOSALPINGITIS: ICD-10-CM

## 2020-10-08 DIAGNOSIS — N70.93 PYOSALPINGITIS: Primary | ICD-10-CM

## 2020-10-08 LAB — SENTARA SPECIMEN COL,SENBCF: NORMAL

## 2020-10-08 PROCEDURE — 99001 SPECIMEN HANDLING PT-LAB: CPT

## 2020-10-09 LAB
ERYTHROCYTE [DISTWIDTH] IN BLOOD BY AUTOMATED COUNT: 12.5 % (ref 10–15.5)
HCT VFR BLD AUTO: 38.9 % (ref 35.1–46.5)
HGB BLD-MCNC: 12.1 G/DL (ref 11.7–15.5)
MCH RBC QN AUTO: 29 PG (ref 26–34)
MCHC RBC AUTO-ENTMCNC: 31 G/DL (ref 31–36)
MCV RBC AUTO: 93 FL (ref 81–99)
PLATELET # BLD AUTO: 325 K/UL (ref 140–440)
PMV BLD AUTO: 11.3 FL (ref 9–13)
PREGNANCY-SERUM, 6158: NEGATIVE
RBC # BLD AUTO: 4.17 M/UL (ref 3.8–5.2)
WBC # BLD AUTO: 6.3 K/UL (ref 4–11)

## 2020-10-20 ENCOUNTER — HOSPITAL ENCOUNTER (OUTPATIENT)
Dept: PREADMISSION TESTING | Age: 40
Discharge: HOME | End: 2020-10-20
Payer: COMMERCIAL

## 2020-10-20 PROCEDURE — 87635 SARS-COV-2 COVID-19 AMP PRB: CPT

## 2020-10-21 LAB — SARS-COV-2, COV2NT: NOT DETECTED

## 2020-10-23 ENCOUNTER — TELEPHONE (OUTPATIENT)
Dept: ONCOLOGY | Age: 40
End: 2020-10-23

## 2020-10-23 NOTE — TELEPHONE ENCOUNTER
Patient confirmed arrival and surgery start time for 10/26/2020. Patient is to arrive at 5:30 AM with a 7:30 AM surgery start time.

## 2020-10-26 ENCOUNTER — ANESTHESIA EVENT (OUTPATIENT)
Dept: SURGERY | Age: 40
End: 2020-10-26
Payer: COMMERCIAL

## 2020-10-26 ENCOUNTER — ANESTHESIA (OUTPATIENT)
Dept: SURGERY | Age: 40
End: 2020-10-26
Payer: COMMERCIAL

## 2020-10-26 ENCOUNTER — HOSPITAL ENCOUNTER (OUTPATIENT)
Age: 40
Setting detail: OUTPATIENT SURGERY
Discharge: HOME | End: 2020-10-26
Attending: OBSTETRICS & GYNECOLOGY | Admitting: OBSTETRICS & GYNECOLOGY
Payer: COMMERCIAL

## 2020-10-26 VITALS
SYSTOLIC BLOOD PRESSURE: 92 MMHG | HEART RATE: 85 BPM | BODY MASS INDEX: 24.65 KG/M2 | WEIGHT: 144.38 LBS | OXYGEN SATURATION: 100 % | TEMPERATURE: 97.2 F | RESPIRATION RATE: 14 BRPM | DIASTOLIC BLOOD PRESSURE: 56 MMHG | HEIGHT: 64 IN

## 2020-10-26 DIAGNOSIS — G89.18 POST-OPERATIVE PAIN: Primary | ICD-10-CM

## 2020-10-26 DIAGNOSIS — N70.93 TUBO-OVARIAN ABSCESS: ICD-10-CM

## 2020-10-26 LAB
ABO + RH BLD: NORMAL
BLOOD GROUP ANTIBODIES SERPL: NORMAL
HCG UR QL: NEGATIVE
SPECIMEN EXP DATE BLD: NORMAL

## 2020-10-26 PROCEDURE — 77030040361 HC SLV COMPR DVT MDII -B: Performed by: OBSTETRICS & GYNECOLOGY

## 2020-10-26 PROCEDURE — 77030037241 HC PRT ACC BLDLSS AIRSEAL CNMD -B: Performed by: OBSTETRICS & GYNECOLOGY

## 2020-10-26 PROCEDURE — 77030014063 HC TIP MANIP UTER COOP -B: Performed by: OBSTETRICS & GYNECOLOGY

## 2020-10-26 PROCEDURE — 81025 URINE PREGNANCY TEST: CPT

## 2020-10-26 PROCEDURE — 77030008683 HC TU ET CUF COVD -A: Performed by: SPECIALIST

## 2020-10-26 PROCEDURE — 74011250636 HC RX REV CODE- 250/636: Performed by: SPECIALIST

## 2020-10-26 PROCEDURE — 76060000033 HC ANESTHESIA 1 TO 1.5 HR: Performed by: OBSTETRICS & GYNECOLOGY

## 2020-10-26 PROCEDURE — 88305 TISSUE EXAM BY PATHOLOGIST: CPT

## 2020-10-26 PROCEDURE — 76210000016 HC OR PH I REC 1 TO 1.5 HR: Performed by: OBSTETRICS & GYNECOLOGY

## 2020-10-26 PROCEDURE — 77030006643: Performed by: SPECIALIST

## 2020-10-26 PROCEDURE — 77030016151 HC PROTCTR LNS DFOG COVD -B: Performed by: OBSTETRICS & GYNECOLOGY

## 2020-10-26 PROCEDURE — 74011000250 HC RX REV CODE- 250: Performed by: OBSTETRICS & GYNECOLOGY

## 2020-10-26 PROCEDURE — 77030008477 HC STYL SATN SLP COVD -A: Performed by: SPECIALIST

## 2020-10-26 PROCEDURE — 77030040830 HC CATH URETH FOL MDII -A: Performed by: OBSTETRICS & GYNECOLOGY

## 2020-10-26 PROCEDURE — 74011250636 HC RX REV CODE- 250/636: Performed by: NURSE ANESTHETIST, CERTIFIED REGISTERED

## 2020-10-26 PROCEDURE — 77030008574 HC TBNG SUC IRR STRY -B: Performed by: OBSTETRICS & GYNECOLOGY

## 2020-10-26 PROCEDURE — 77030020782 HC GWN BAIR PAWS FLX 3M -B: Performed by: OBSTETRICS & GYNECOLOGY

## 2020-10-26 PROCEDURE — 74011250636 HC RX REV CODE- 250/636: Performed by: OBSTETRICS & GYNECOLOGY

## 2020-10-26 PROCEDURE — 76010000934 HC OR TIME 1 TO 1.5HR INTENSV - TIER 2: Performed by: OBSTETRICS & GYNECOLOGY

## 2020-10-26 PROCEDURE — 77030035277 HC OBTRTR BLDELSS DISP INTU -B: Performed by: OBSTETRICS & GYNECOLOGY

## 2020-10-26 PROCEDURE — 36415 COLL VENOUS BLD VENIPUNCTURE: CPT

## 2020-10-26 PROCEDURE — 58661 LAPAROSCOPY REMOVE ADNEXA: CPT | Performed by: OBSTETRICS & GYNECOLOGY

## 2020-10-26 PROCEDURE — 76210000022 HC REC RM PH II 1.5 TO 2 HR: Performed by: OBSTETRICS & GYNECOLOGY

## 2020-10-26 PROCEDURE — 77030020703 HC SEAL CANN DISP INTU -B: Performed by: OBSTETRICS & GYNECOLOGY

## 2020-10-26 PROCEDURE — 74011000272 HC RX REV CODE- 272: Performed by: OBSTETRICS & GYNECOLOGY

## 2020-10-26 PROCEDURE — 77030002966 HC SUT PDS J&J -A: Performed by: OBSTETRICS & GYNECOLOGY

## 2020-10-26 PROCEDURE — 77030028402 HC SYS LAPSC TISS RETRV AMR -B: Performed by: OBSTETRICS & GYNECOLOGY

## 2020-10-26 PROCEDURE — 2709999900 HC NON-CHARGEABLE SUPPLY: Performed by: OBSTETRICS & GYNECOLOGY

## 2020-10-26 PROCEDURE — 77030033200 HC PRT CLSR CRTR THOMP COOP -C: Performed by: OBSTETRICS & GYNECOLOGY

## 2020-10-26 PROCEDURE — 86900 BLOOD TYPING SEROLOGIC ABO: CPT

## 2020-10-26 PROCEDURE — 77030031139 HC SUT VCRL2 J&J -A: Performed by: OBSTETRICS & GYNECOLOGY

## 2020-10-26 PROCEDURE — C9290 INJ, BUPIVACAINE LIPOSOME: HCPCS | Performed by: OBSTETRICS & GYNECOLOGY

## 2020-10-26 PROCEDURE — 77030002933 HC SUT MCRYL J&J -A: Performed by: OBSTETRICS & GYNECOLOGY

## 2020-10-26 PROCEDURE — 74011000250 HC RX REV CODE- 250: Performed by: NURSE ANESTHETIST, CERTIFIED REGISTERED

## 2020-10-26 RX ORDER — FENTANYL CITRATE 50 UG/ML
INJECTION, SOLUTION INTRAMUSCULAR; INTRAVENOUS AS NEEDED
Status: DISCONTINUED | OUTPATIENT
Start: 2020-10-26 | End: 2020-10-26 | Stop reason: HOSPADM

## 2020-10-26 RX ORDER — MIDAZOLAM HYDROCHLORIDE 1 MG/ML
INJECTION, SOLUTION INTRAMUSCULAR; INTRAVENOUS AS NEEDED
Status: DISCONTINUED | OUTPATIENT
Start: 2020-10-26 | End: 2020-10-26 | Stop reason: HOSPADM

## 2020-10-26 RX ORDER — SODIUM CHLORIDE, SODIUM LACTATE, POTASSIUM CHLORIDE, CALCIUM CHLORIDE 600; 310; 30; 20 MG/100ML; MG/100ML; MG/100ML; MG/100ML
125 INJECTION, SOLUTION INTRAVENOUS CONTINUOUS
Status: DISCONTINUED | OUTPATIENT
Start: 2020-10-26 | End: 2020-10-26 | Stop reason: HOSPADM

## 2020-10-26 RX ORDER — ROCURONIUM BROMIDE 10 MG/ML
INJECTION, SOLUTION INTRAVENOUS AS NEEDED
Status: DISCONTINUED | OUTPATIENT
Start: 2020-10-26 | End: 2020-10-26 | Stop reason: HOSPADM

## 2020-10-26 RX ORDER — KETAMINE HCL IN 0.9 % NACL 50 MG/5 ML
SYRINGE (ML) INTRAVENOUS AS NEEDED
Status: DISCONTINUED | OUTPATIENT
Start: 2020-10-26 | End: 2020-10-26 | Stop reason: HOSPADM

## 2020-10-26 RX ORDER — ONDANSETRON 2 MG/ML
INJECTION INTRAMUSCULAR; INTRAVENOUS AS NEEDED
Status: DISCONTINUED | OUTPATIENT
Start: 2020-10-26 | End: 2020-10-26 | Stop reason: HOSPADM

## 2020-10-26 RX ORDER — HYDROMORPHONE HYDROCHLORIDE 1 MG/ML
0.5 INJECTION, SOLUTION INTRAMUSCULAR; INTRAVENOUS; SUBCUTANEOUS
Status: DISCONTINUED | OUTPATIENT
Start: 2020-10-26 | End: 2020-10-26 | Stop reason: HOSPADM

## 2020-10-26 RX ORDER — SODIUM CHLORIDE, SODIUM LACTATE, POTASSIUM CHLORIDE, CALCIUM CHLORIDE 600; 310; 30; 20 MG/100ML; MG/100ML; MG/100ML; MG/100ML
50 INJECTION, SOLUTION INTRAVENOUS CONTINUOUS
Status: DISCONTINUED | OUTPATIENT
Start: 2020-10-26 | End: 2020-10-26 | Stop reason: HOSPADM

## 2020-10-26 RX ORDER — DEXAMETHASONE SODIUM PHOSPHATE 4 MG/ML
INJECTION, SOLUTION INTRA-ARTICULAR; INTRALESIONAL; INTRAMUSCULAR; INTRAVENOUS; SOFT TISSUE AS NEEDED
Status: DISCONTINUED | OUTPATIENT
Start: 2020-10-26 | End: 2020-10-26 | Stop reason: HOSPADM

## 2020-10-26 RX ORDER — NALOXONE HYDROCHLORIDE 0.4 MG/ML
0.1 INJECTION, SOLUTION INTRAMUSCULAR; INTRAVENOUS; SUBCUTANEOUS
Status: DISCONTINUED | OUTPATIENT
Start: 2020-10-26 | End: 2020-10-26 | Stop reason: HOSPADM

## 2020-10-26 RX ORDER — HEPARIN SODIUM 5000 [USP'U]/ML
5000 INJECTION, SOLUTION INTRAVENOUS; SUBCUTANEOUS ONCE
Status: COMPLETED | OUTPATIENT
Start: 2020-10-26 | End: 2020-10-26

## 2020-10-26 RX ORDER — CEFAZOLIN SODIUM/WATER 2 G/20 ML
2 SYRINGE (ML) INTRAVENOUS ONCE
Status: COMPLETED | OUTPATIENT
Start: 2020-10-26 | End: 2020-10-26

## 2020-10-26 RX ORDER — PROPOFOL 10 MG/ML
INJECTION, EMULSION INTRAVENOUS AS NEEDED
Status: DISCONTINUED | OUTPATIENT
Start: 2020-10-26 | End: 2020-10-26 | Stop reason: HOSPADM

## 2020-10-26 RX ORDER — LIDOCAINE HYDROCHLORIDE 20 MG/ML
INJECTION, SOLUTION EPIDURAL; INFILTRATION; INTRACAUDAL; PERINEURAL AS NEEDED
Status: DISCONTINUED | OUTPATIENT
Start: 2020-10-26 | End: 2020-10-26 | Stop reason: HOSPADM

## 2020-10-26 RX ORDER — OXYCODONE AND ACETAMINOPHEN 5; 325 MG/1; MG/1
1 TABLET ORAL
Qty: 40 TAB | Refills: 0 | Status: SHIPPED | OUTPATIENT
Start: 2020-10-26 | End: 2020-11-09

## 2020-10-26 RX ORDER — FENTANYL CITRATE 50 UG/ML
25 INJECTION, SOLUTION INTRAMUSCULAR; INTRAVENOUS
Status: DISCONTINUED | OUTPATIENT
Start: 2020-10-26 | End: 2020-10-26 | Stop reason: HOSPADM

## 2020-10-26 RX ORDER — OXYCODONE AND ACETAMINOPHEN 5; 325 MG/1; MG/1
1 TABLET ORAL AS NEEDED
Status: DISCONTINUED | OUTPATIENT
Start: 2020-10-26 | End: 2020-10-26 | Stop reason: HOSPADM

## 2020-10-26 RX ORDER — ONDANSETRON 2 MG/ML
4 INJECTION INTRAMUSCULAR; INTRAVENOUS ONCE
Status: DISCONTINUED | OUTPATIENT
Start: 2020-10-26 | End: 2020-10-26 | Stop reason: HOSPADM

## 2020-10-26 RX ADMIN — Medication 20 MG: at 08:05

## 2020-10-26 RX ADMIN — SUGAMMADEX 150 MG: 100 INJECTION, SOLUTION INTRAVENOUS at 08:30

## 2020-10-26 RX ADMIN — SODIUM CHLORIDE, SODIUM LACTATE, POTASSIUM CHLORIDE, AND CALCIUM CHLORIDE 125 ML/HR: 600; 310; 30; 20 INJECTION, SOLUTION INTRAVENOUS at 06:26

## 2020-10-26 RX ADMIN — MIDAZOLAM 2 MG: 1 INJECTION INTRAMUSCULAR; INTRAVENOUS at 07:26

## 2020-10-26 RX ADMIN — Medication 2 G: at 07:42

## 2020-10-26 RX ADMIN — ONDANSETRON HYDROCHLORIDE 4 MG: 2 INJECTION INTRAMUSCULAR; INTRAVENOUS at 08:00

## 2020-10-26 RX ADMIN — ROCURONIUM BROMIDE 40 MG: 10 INJECTION, SOLUTION INTRAVENOUS at 07:33

## 2020-10-26 RX ADMIN — PROPOFOL 180 MG: 10 INJECTION, EMULSION INTRAVENOUS at 07:33

## 2020-10-26 RX ADMIN — DEXAMETHASONE SODIUM PHOSPHATE 4 MG: 4 INJECTION, SOLUTION INTRAMUSCULAR; INTRAVENOUS at 08:00

## 2020-10-26 RX ADMIN — HEPARIN SODIUM 5000 UNITS: 5000 INJECTION INTRAVENOUS; SUBCUTANEOUS at 06:37

## 2020-10-26 RX ADMIN — LIDOCAINE HYDROCHLORIDE 60 MG: 20 INJECTION, SOLUTION EPIDURAL; INFILTRATION; INTRACAUDAL; PERINEURAL at 07:33

## 2020-10-26 RX ADMIN — Medication 10 MG: at 08:28

## 2020-10-26 RX ADMIN — SODIUM CHLORIDE, SODIUM LACTATE, POTASSIUM CHLORIDE, AND CALCIUM CHLORIDE 125 ML/HR: 600; 310; 30; 20 INJECTION, SOLUTION INTRAVENOUS at 09:28

## 2020-10-26 RX ADMIN — FENTANYL CITRATE 100 MCG: 50 INJECTION, SOLUTION INTRAMUSCULAR; INTRAVENOUS at 07:33

## 2020-10-26 RX ADMIN — SODIUM CHLORIDE, SODIUM LACTATE, POTASSIUM CHLORIDE, AND CALCIUM CHLORIDE 50 ML/HR: 600; 310; 30; 20 INJECTION, SOLUTION INTRAVENOUS at 06:33

## 2020-10-26 NOTE — PERIOP NOTES
Pt up to bathroom to void with assistance. Patient could not void    Patient and family given discharge instructions    AVS med list reviewed, no duplicates noted. D/C instructions reviewed, opportunity for questions.

## 2020-10-26 NOTE — INTERVAL H&P NOTE
Update History & Physical 
 
The Patient's History and Physical of October 26 was reviewed with the patient and I examined the patient. There was no change. The surgical site was confirmed by the patient and me. Plan:  The risk, benefits, expected outcome, and alternative to the recommended procedure have been discussed with the patient. Patient understands and wants to proceed with the procedure.  
 
Electronically signed by Bentley Alonso MD on 10/26/2020 at 7:14 AM

## 2020-10-26 NOTE — OP NOTES
[facility]  OPERATIVE REPORT    Name:  Johnson Enriquez  MR#:   [id number]  :  1980  ACCOUNT #:  [de-identified]  DATE OF SERVICE:  10/26/2020    PREOPERATIVE DIAGNOSES:  History of tubo-ovarian abscess, right ovarian cyst.    POSTOPERATIVE DIAGNOSES:  History of tubo-ovarian abscess, right ovarian cyst.    PROCEDURE PERFORMED:  Robotic-assisted laparoscopic right salpingo-oophorectomy, lysis of adhesions. SURGEON:  Sneha Smith DO    ASSISTANT:  Rima White. ASSISTANT TASKS:  Retraction and closing. ANESTHESIA:  General.    FLUIDS:  1000 mL. COMPLICATIONS:  None. SPECIMENS REMOVED:  Right tube and ovary. IMPLANTS:  None. ESTIMATED BLOOD LOSS:  25 mL. URINE OUTPUT:  200 mL clear urine. FINDINGS:  Laparoscopic findings revealed an enlarged right ovary with dilated fallopian tube on both right and left sides with associated filmy adhesions involving both ovaries to the uterus and cul-de-sac with some associated inclusion cyst, otherwise all peritoneal surfaces were smooth. INDICATIONS:  The patient is a 75-year-old, who initially was diagnosed with a tubo-ovarian abscess in January, underwent laparoscopic evaluation. She was then admitted on IV antibiotics. Discharged with oral antibiotics times two weeks. She initially felt better, but then had increasing pain, she was seen back in April and had an ultrasound with persistent TOA and was placed on oral antibiotics for another week. She underwent a CT scan and underwent a CT-guided drainage placement in 2019. She was seen for followup and had an ultrasound, which revealed an enlarged right ovary with complex cyst.  She presents today for surgical evaluation. PROCEDURE:  The patient was taken to the operating room, where general anesthesia was obtained without difficulty. She was placed in dorsal lithotomy position, was prepped and draped in normal sterile fashion.   A surgical time-out was taken by the entire surgical team.  A Gibson catheter was then placed. A sterile speculum was then placed in the patient's vagina. The anterior lip of the cervix was grasped with a single-tooth tenaculum. The cervix was then dilated and an 8-cm SOPHIA tip was placed without difficulty. Attention was then turned to the abdomen, where a Veress needle was introduced in the umbilicus. Pneumoperitoneum was obtained to 15 mmHg. An incision was then made above the umbilicus and an 8-mm robotic trocar was advanced. Laparoscope was introduced. There was no evidence of injury from entry. The patient was placed in Trendelenburg with findings above. At this time, an 8-mm robotic trocar was placed on the left and right side of the abdomen under direct visualization and a 5-mm trocar was placed in the right lower quadrant. The robot was then docked at the console. Attention was then turned to the cul-de-sac, where many of the filmy adhesions were taken down sharply using scissors and any areas of cystic fluid was drained. The left ovary was then freed up from its adhesions to the uterus and cul-de-sac. Attention was then turned to the right side, where the peritoneum overlying the right psoas muscles was elevated and incised. This peritoneal incision was extended superior and inferiorly along the right IP ligament. The right retroperitoneal space was opened, the ureter was identified. A window in the peritoneum overlying the ureter was made, and the right IP ligament was skeletonized, sealed, and divided. The adnexa was retracted medially and its attachments to the right-sided pelvic peritoneum were taken down sharply. The right fallopian tube and utero-ovarian ligament were then sealed and divided using bipolar and monopolar cautery, and any remaining attachments to the pelvic sidewall were taken down sharply. The specimen was then placed in Endo Catch bag.   At this point, all instruments were removed and the robot was undocked. At the bedside, the fascial incision was extended using ring forceps at the umbilicus and the ovary was pulled through the incision and handed off for permanent specimen. At this point, all trocars were removed and pneumoperitoneum was relived. The supraumbilical fascial incision was then reapproximated with 0 Vicryl figure-of-eight stitch. All skin sites were closed with 4-0 Monocryl, and Exparel was injected at the conclusion. The patient tolerated the procedure well. Sponge, needle and instrument counts were correct x2, and the patient was taken to the recovery room in stable condition.       Joella Pallas, DO [email or web address]  D:  10/26/2020 8:34  T:  10/26/2020 11:33  JOB #:  [id number]

## 2020-10-26 NOTE — DISCHARGE INSTRUCTIONS
DISCHARGE SUMMARY from Nurse  Increase fluids today  Advance diet as tolerated  Call Dr. Higgins Severe if you experience fever over 101, chills, uncontrolled nausea and vomiting, uncontrolled pain, inability to urinate, and/or if you saturate more than one peripad in 2 consecutive hours    PATIENT INSTRUCTIONS:    After general anesthesia or intravenous sedation, for 24 hours or while taking prescription Narcotics:  · Limit your activities  · Do not drive and operate hazardous machinery  · Do not make important personal or business decisions  · Do  not drink alcoholic beverages  · If you have not urinated within 8 hours after discharge, please contact your surgeon on call. Report the following to your surgeon:  · Excessive pain, swelling, redness or odor of or around the surgical area  · Temperature over 100.5  · Nausea and vomiting lasting longer than 4 hours or if unable to take medications  · Any signs of decreased circulation or nerve impairment to extremity: change in color, persistent  numbness, tingling, coldness or increase pain  · Any questions    What to do at Home:  Recommended activity: Activity as tolerated and no driving for today    If you experience any of the following symptoms chest pain, chest tightness, shortness of breath, or difficulty breathing, please call 911. *  Please give a list of your current medications to your Primary Care Provider. *  Please update this list whenever your medications are discontinued, doses are      changed, or new medications (including over-the-counter products) are added. *  Please carry medication information at all times in case of emergency situations. These are general instructions for a healthy lifestyle:    No smoking/ No tobacco products/ Avoid exposure to second hand smoke  Surgeon General's Warning:  Quitting smoking now greatly reduces serious risk to your health.     Obesity, smoking, and sedentary lifestyle greatly increases your risk for illness    A healthy diet, regular physical exercise & weight monitoring are important for maintaining a healthy lifestyle    You may be retaining fluid if you have a history of heart failure or if you experience any of the following symptoms:  Weight gain of 3 pounds or more overnight or 5 pounds in a week, increased swelling in our hands or feet or shortness of breath while lying flat in bed. Please call your doctor as soon as you notice any of these symptoms; do not wait until your next office visit. The discharge information has been reviewed with the patient and caregiver. The patient and caregiver verbalized understanding. Discharge medications reviewed with the patient and caregiver and appropriate educational materials and side effects teaching were provided. .Patient armband removed and shredded  ___________________________________________________________________________________________________________________________________         10 Things to Do When You Have COVID-19    Stay home. Don't go to school, work, or public areas. And don't use public transportation, ride-shares, or taxis unless you have no choice. Leave your home only if you need to get medical care. But call the doctor's office first so they know you're coming. And wear a cloth face cover. Ask before leaving isolation. Talk with your doctor or other health professional about when it will be safe for you to leave isolation. Wear a cloth face cover when you are around other people. It can help stop the spread of the virus when you cough or sneeze. Limit contact with people in your home. If possible, stay in a separate bedroom and use a separate bathroom. Avoid contact with pets and other animals. If possible, have a friend or family member care for them while you're sick. Cover your mouth and nose with a tissue when you cough or sneeze. Then throw the tissue in the trash right away.      Wash your hands often, especially after you cough or sneeze. Use soap and water, and scrub for at least 20 seconds. If soap and water aren't available, use an alcohol-based hand . Don't share personal household items. These include bedding, towels, cups and glasses, and eating utensils. Clean and disinfect your home every day. Use household  or disinfectant wipes or sprays. Take special care to clean things that you grab with your hands. These include doorknobs, remote controls, phones, and handles on your refrigerator and microwave. And don't forget countertops, tabletops, bathrooms, and computer keyboards. Take acetaminophen (Tylenol) to relieve fever and body aches. Read and follow all instructions on the label. Current as of: July 10, 2020               Content Version: 12.6  © 5166-2921 [employer]. Care instructions adapted under license by [employer] (which disclaims liability or warranty for this information). If you have questions about a medical condition or this instruction, always ask your healthcare professional. [place] [address] any warranty or liability for your use of this information.

## 2020-10-26 NOTE — H&P
1263 [facility]  [address] [place] [address], [address], 4[address] [facility]  [address] [facility], [address] [facility] [place], [address] [facility]177 [phone]  Liane Gordon DO        Patient ID:  Name:              Rosalia Cho  MRN:               [id number]  :               1980/39 y.o. Date:               10/26/2020        HISTORY OF PRESENT ILLNESS:  Rosalia Cho is a 37 y.o.  premenopausal female referred by Dr. Marta Valera for tubo-ovarian abscess. Pt was diagnosed with TOA in January and underwent laparoscopic evaluation. Was then admitted on IV abx x 1 wk and discharged with oral abx x 2 wks. She felt better but then started having increasing pain so she was seen by gyn in 2019 and had an ultrasound with persistent TOA and was placed on oral abx for another week. She then had a CT scan and seen by Dr. Marta Valera. She was seen in our office 19 and is s/p CT guided drain placement into pelvic fluid collection via left transgluteal approach on 19. Pt feels well. No fevers.            Labs:  None                        Imaging  US pelvis 2020  FINDINGS:     UTERUS: Anteverted    > Size: 10.1 x 5.0 x 6.0 cm    > Masses:   Lesion 1- Hypoechoic mass near the lower uterine segment/cervix measures 1.6 x  0.8 x 1.6 cm, previously 1.4 x 0.7 x 1.5 cm (ultrasound 2019). Lesion 2 -anechoic cyst in the region of the cervix measures 0.6 x 0.3 x 0.6 cm,  likely nabothian cyst.     > Endometrium: Measures up to 1.5 cm      RIGHT ADNEXA:     > Right ovary is enlarged measuring 5.6 x 3.4 x 5.6 cm. Color and spectral  Doppler demonstrate normal flow to the right ovary with no evidence of ovarian  torsion. Arterial and venous waveforms are normal.    > Anechoic cystic structure in the right ovary measures 5.2 x 2.5 x 5.6 cm.   Complex cystic structure extends from the right ovary into the adnexa and  measures 2.7 x 1.6 x 2.3 cm.     LEFT ADNEXA:    > The left ovary is borderline enlarged measuring 4.5 x 2.7 x 3.2. Color and  spectral Doppler demonstrate normal flow to the right ovary with no evidence of  ovarian torsion. Arterial and venous waveforms are normal.    > Hypoechoic structure in the left ovary measures 2.1 x 1.4 x 1.9 cm.     OTHER: Complex fluid is seen in the cul-de-sac with several septations noted.     Suboptimal exam due to bowel gas .     _______________     IMPRESSION  IMPRESSION:     1.  Complex fluid extending from the right ovary into the right adnexa with  internal septations may represent complex fluid within the fallopian tube (as  may be seen in tubo-ovarian abscess) versus a complex/complicated paraovarian  cyst or inclusion cyst. Contrast-enhanced MRI may be used for further  characterization as clinically indicated. 2.  Arterial and venous blood flow seen in both ovaries however given mildly  enlarged ovarian size, intermittent torsion/detorsion cannot be excluded. 3.  Hypoechoic mass in the inferior uterus/cervix appears similar to 2019 and  may represent a fibroid. TVUS 7/2/2019  FINDINGS:      Uterus: The uterus is normal in size. The uterus measured 10.4 x 4.7 x 7.0 cm. The posterior uterine wall demonstrated a focal oval area of hypoechogenicity  measuring 1.4 x 0.7 x 1.5 cm. This likely represents a leiomyoma. inda Jefferson  endometrial stripe was normal in thickness and measured 16 mm in double wall  thickness.       Ovaries: Both ovaries are identified.  The right ovary measured 6.9 x 3.3 x 4.5  cm.  The left ovary measured 5.0 x 4.0 x 4.9 cm. Multiple right ovarian  follicles are present. The largest demonstrates a diameter of 2.6 cm. The left  ovary shows a focal hypoechoic structure measuring 4.5 x 3.0 x 3.4 cm. This does  not demonstrate internal flow. This may represent a cyst with debris or  hemorrhage.  Bilateral ovarian vascular flow is demonstrated.       Other:  An area of free fluid is noted between the right and left ovaries  posterior to the uterus. Some of this may be related to a large left ovarian  cyst on the left that was noted on the prior abdominal CT     _______________     IMPRESSION  IMPRESSION:     Bilateral ovarian cysts are present. A left ovarian cyst demonstrates  homogeneous hypoechoic material which could represent either proteinaceous  material/debris or hemorrhage. A small amount of fluid is noted in the pelvis. An anechoic cystic area seen posterior to the uterus which could correlate with  the large 6 cm cystic structure on recent abdominal CT     Small focal hypoechoic area within uterine wall which is likely a leiomyoma  5/8/19  CT  FINDINGS:     LOWER CHEST: Within normal limits.     LIVER, BILIARY: Liver is normal. No biliary dilation. Tiny gallstone is present. No signs of cholecystitis.     PANCREAS: Normal.     SPLEEN: Normal.     ADRENALS: Normal.     KIDNEYS: Normal.     LYMPH NODES: No enlarged lymph nodes.     GASTROINTESTINAL TRACT: No bowel dilation or wall thickening. Normal appendix.     PELVIC ORGANS: Left paramedian transgluteal pigtail drainage catheter is present  in the posterior left pelvis. No residual fluid collection around the catheter. Slightly further cephalad in the left pelvis, 6.2 x 3.8 cm focus of low-density  fluid previously measured 6.9 x 5.4 cm and in the right hemipelvis, 3.5 x 3.1 cm  low-density fluid collection is present measuring 2.9 x 1.7 cm, and was  previously moderately dense, now low density. A couple other smaller rounded  fluid collections are present in the lower pelvis near the midline. Urinary  bladder appears normal.     VASCULATURE: Within normal limits.     BONES: No acute or aggressive osseous abnormalities identified.     OTHER: None.     _______________     IMPRESSION  IMPRESSION:        1. Interval complete drainage of previous left pelvic fluid collection, with  pigtail catheter in situ.    2. Interval diminished size of 6.2 x 3.8 cm (previously 6.9 x 5.4 cm) left mid  pelvic uncomplicated appearing cyst. 3.5 x 3.1 cm right pelvic uncomplicated  cystic appearing collection previously measured 2.9 x 1.7 cm, however, was  previously complicated with internal debris. Other smaller rounded areas of  pelvic fluid are not significantly changed. None of these areas of focal fluid  containing gas, and are not strongly suspicious of tubo-ovarian abscess. 3. Tiny gallstone, no signs of cholecystitis. ROS:   As above        There are no active problems to display for this patient.          Past Medical History:   Diagnosis Date    Dermoid 2017    Tubo-ovarian abscess              Past Surgical History:   Procedure Laterality Date    HX  SECTION        HX  SECTION   , 2006     X2    HX TUBAL LIGATION Bilateral 2006    HX WISDOM TEETH EXTRACTION         upper teeth    LAP,TUBAL CAUTERY                   OB History         4    Para   2    Term   2       0    AB   0    Living            SAB   0    TAB   0    Ectopic   0    Molar   0    Multiple        Live Births               Obstetric Comments   C-sections X4             Social History            Tobacco Use    Smoking status: Passive Smoke Exposure - Never Smoker    Smokeless tobacco: Never Used    Tobacco comment:  vapes   Substance Use Topics    Alcohol use:  Yes       Comment: \"occassionally\" wine            Family History   Problem Relation Age of Onset    Hypertension Mother      High Cholesterol Mother      Hypertension Father      High Cholesterol Father      Other Father           Pre-diabetic    No Known Problems Brother      Colon Cancer Maternal Aunt      Hypertension Maternal Grandmother      Stroke Maternal Grandmother      Hypertension Maternal Grandfather      Stroke Maternal Grandfather      Colon Cancer Maternal Grandfather      Diabetes Paternal Grandmother      Hypertension Paternal Grandmother      Stroke Paternal Grandmother      Hypertension Paternal Grandfather      Stroke Paternal Grandfather      Diabetes Maternal Grandmother        Current Outpatient Medications   Medication Sig    ibuprofen (MOTRIN) 200 mg tablet Take  by mouth as needed for Pain.  fexofenadine HCl (ALLEGRA PO) Take  by mouth as needed for Other (allergies).  fluticasone propionate (FLONASE NA) by Nasal route as needed for Other (allergies).      No current facility-administered medications for this visit.       No Known Allergies         OBJECTIVE:     Physical Exam  VITAL SIGNS: Visit Vitals  Ht 5' 4\" (1.626 m)   Wt 66.2 kg (146 lb)   BMI 25.06 kg/m²       GENERAL TRELL: in no apparent distress and well developed and well nourished            IMPRESSION/PLAN:  1. TOA              -s/p IR drain placement              -completed PO cipro/flagyl x 2 weeks              -reviewed ultrasound with persistent mass in right ovary              -recommend proceeding with right salpingo-oophorectomy              -discussed robotic approach              -surgery to be scheduled in sept at [facility]        The total time spent was 25 minutes regarding this patients diagnosis of tubo-ovarian abscess and >50% of this time was spent counseling and coordinating care     [address] [facility]

## 2020-10-26 NOTE — PERIOP NOTES
TRANSFER - OUT REPORT:    Verbal report given to Shabbir Blank Rn(name) on [employer]  being transferred to phase 2(unit) for routine post - op       Report consisted of patients Situation, Background, Assessment and   Recommendations(SBAR). Information from the following report(s) SBAR, Kardex, Procedure Summary, Intake/Output and MAR was reviewed with the receiving nurse. Lines:   Peripheral IV 10/26/20 Anterior; Left Forearm (Active)   Site Assessment Clean, dry, & intact 10/26/20 0843   Phlebitis Assessment 0 10/26/20 0843   Infiltration Assessment 0 10/26/20 0843   Dressing Status Clean, dry, & intact 10/26/20 0843   Dressing Type Transparent;Tape 10/26/20 0843   Hub Color/Line Status Patent 10/26/20 0804   Action Taken Wrapped 10/26/20 0804   Alcohol Cap Used No 10/26/20 0633       Peripheral IV 10/26/20 Posterior;Right Hand (Active)   Site Assessment Clean, dry, & intact 10/26/20 0843   Phlebitis Assessment 0 10/26/20 0843   Infiltration Assessment 0 10/26/20 0843   Dressing Status Clean, dry, & intact 10/26/20 0843   Dressing Type Transparent;Tape 10/26/20 0843   Hub Color/Line Status Patent 10/26/20 0804   Action Taken Wrapped 10/26/20 0804   Alcohol Cap Used No 10/26/20 6005        Opportunity for questions and clarification was provided.       Patient transported with:   Registered Nurse  Tech

## 2020-10-26 NOTE — ANESTHESIA POSTPROCEDURE EVALUATION
Post-Anesthesia Evaluation and Assessment    Cardiovascular Function/Vital Signs  Visit Vitals  /63   Pulse 63   Temp 36.2 °C (97.2 °F)   Resp 14   Ht 5' 4\" (1.626 m)   Wt 65.5 kg (144 lb 6 oz)   SpO2 100%   BMI 24.78 kg/m²       Patient is status post Procedure(s):  ROBOTIC ASSISTED LAPAROSCOPIC RESECTION OF PELVIC MASS,RIGHT SALPINGO-OOPHORECTOMY. Nausea/Vomiting: Controlled. Postoperative hydration reviewed and adequate. Pain:  Pain Scale 1: Numeric (0 - 10) (10/26/20 0908)  Pain Intensity 1: 0 (10/26/20 0908)   Managed. Neurological Status:   Neuro (WDL): Within Defined Limits (10/26/20 0640)   At baseline. Mental Status and Level of Consciousness: Baseline and appropriate for discharge. Pulmonary Status:   O2 Device: Room air (10/26/20 0908)   Adequate oxygenation and airway patent. Complications related to anesthesia: None    Post-anesthesia assessment completed. No concerns. Patient has met all discharge requirements.     Signed By: Jose Rosenthal MD    October 26, 2020

## 2020-10-26 NOTE — BRIEF OP NOTE
Brief Postoperative Note    Patient: Otis Zeng  YOB: 1980  MRN: [id number]    Date of Procedure: 10/26/2020     Pre-Op Diagnosis: PELVIC MASS,TUBO OVARIAN ABSCESS    Post-Op Diagnosis: Same as preoperative diagnosis. Procedure(s):  ROBOTIC ASSISTED LAPAROSCOPIC RESECTION OF PELVIC MASS,RIGHT SALPINGO-OOPHORECTOMY    Surgeon(s):  Deepak Alaniz MD    Surgical Assistant: None    Anesthesia: General     Estimated Blood Loss (mL): less than 50     Complications: None    Specimens:   ID Type Source Tests Collected by Time Destination   1 : RIGHT FALLOPIAN TUBE AND RIGHT OVARY Preservative Ovary  Deepak Alaniz MD 10/26/2020 0825 Pathology        Implants: * No implants in log *    Drains: * No LDAs found *    Findings: adhesions involving bilateral ovaries and uterus with some associated peritoneal inclusion cysts.  Dilated right and left fallopian tube    Electronically Signed by Angie Ordoñez MD on 10/26/2020 at 8:28 AM

## 2020-10-26 NOTE — PERIOP NOTES
Reviewed PTA medication list with patient/caregiver and patient/caregiver denies any additional medications. Patient admits to having a responsible adult care for them at home for at least 24 hours after surgery. Patient encouraged to use gown warming system and informed that using said warming gown to regulate body temperature prior to a procedure has been shown to help reduce the risks of blood clots and infection. Patient's pharmacy of choice verified and documented in PTA medication section. Dual skin assessment & fall risk band verification completed with A Mp ARAYA.

## 2020-10-28 ENCOUNTER — TELEPHONE (OUTPATIENT)
Dept: ONCOLOGY | Age: 40
End: 2020-10-28

## 2020-10-28 NOTE — LETTER
NOTIFICATION OF RETURN TO WORK / SCHOOL 
 
10/29/2020 1:32 PM 
 
Ms. Justino Phelps 
[address] [facility] [address] [place] To Whom It May Concern: 
 
Justino Phelps was under the care of [address] [place] [address] Drive from 10/26/2020 to Current. She will be able to return to work/school on 11/2/2020 with no lifting, pushing, pulling, or carrying over 10 lbs until 11/9/2020. If there are questions or concerns please have the patient contact our office. Sincerely, Elesa Dubin

## 2020-10-28 NOTE — TELEPHONE ENCOUNTER
Patient would like a return to work letter faxed to her employer. Patient states she feels great and would like to return to work this upcoming Monday, 11/2/2020. Fax [phone].

## 2020-10-29 NOTE — TELEPHONE ENCOUNTER
Spoke with patient for clarification of work duties. 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 She denies pushing, pulling, lifting, or carrying over 10 lbs at work.  Letter typed and mailed per patient request.

## 2020-11-10 ENCOUNTER — OFFICE VISIT (OUTPATIENT)
Dept: ONCOLOGY | Age: 40
End: 2020-11-10
Payer: COMMERCIAL

## 2020-11-10 VITALS
HEART RATE: 71 BPM | DIASTOLIC BLOOD PRESSURE: 75 MMHG | SYSTOLIC BLOOD PRESSURE: 109 MMHG | HEIGHT: 64 IN | WEIGHT: 146.8 LBS | OXYGEN SATURATION: 99 % | BODY MASS INDEX: 25.06 KG/M2 | TEMPERATURE: 99 F

## 2020-11-10 DIAGNOSIS — N70.93 TUBO-OVARIAN ABSCESS: Primary | ICD-10-CM

## 2020-11-10 PROCEDURE — 99024 POSTOP FOLLOW-UP VISIT: CPT | Performed by: OBSTETRICS & GYNECOLOGY

## 2020-11-10 NOTE — PROGRESS NOTES
[address], [address] [facility]  [address] [facility], [address] [place] [address]  [place] [phone]  Aga Bagley DO      Patient ID:  Name:  Dannie Faria  MRN:  [id number]  :  1980/40 y.o. Date:  11/10/2020      HISTORY OF PRESENT ILLNESS:  Dannie Faria is a 41 y.o.  premenopausal female referred by Dr. Maria Del Rosario Holcomb for tubo-ovarian abscess. Pt was diagnosed with TOA in January and underwent laparoscopic evaluation. Was then admitted on IV abx x 1 wk and discharged with oral abx x 2 wks. She felt better but then started having increasing pain so she was seen by gyn in 2019 and had an ultrasound with persistent TOA and was placed on oral abx for another week. She then had a CT scan and seen by Dr. Maria Del Rosario Holcomb. She was seen in our office 19 and is s/p CT guided drain placement into pelvic fluid collection via left transgluteal approach on 19. S/p Robotic-assisted laparoscopic right salpingo-oophorectomy, lysis of adhesions on 10/26/2020. Here for post op visit   Feels well. Minimal pain. Tolerating diet. No bleeding. Labs:  Pathology     RIGHT FALLOPIAN TUBE AND OVARY:   CHRONIC SALPINGITIS WITH LUMINAL OBLITERATION; PARATUBAL AND PERITONEAL INCLUSION CYSTS; OVARY WITH NONSPECIFIC ADHESIONS AND FOLLICLE CYSTS. Imaging  US pelvis 2020  FINDINGS:     UTERUS: Anteverted    > Size: 10.1 x 5.0 x 6.0 cm    > Masses:   Lesion 1- Hypoechoic mass near the lower uterine segment/cervix measures 1.6 x  0.8 x 1.6 cm, previously 1.4 x 0.7 x 1.5 cm (ultrasound 2019). Lesion 2 -anechoic cyst in the region of the cervix measures 0.6 x 0.3 x 0.6 cm,  likely nabothian cyst.     > Endometrium: Measures up to 1.5 cm      RIGHT ADNEXA:     > Right ovary is enlarged measuring 5.6 x 3.4 x 5.6 cm.  Color and spectral  Doppler demonstrate normal flow to the right ovary with no evidence of ovarian  torsion. Arterial and venous waveforms are normal.    > Anechoic cystic structure in the right ovary measures 5.2 x 2.5 x 5.6 cm. Complex cystic structure extends from the right ovary into the adnexa and  measures 2.7 x 1.6 x 2.3 cm.     LEFT ADNEXA:    > The left ovary is borderline enlarged measuring 4.5 x 2.7 x 3.2. Color and  spectral Doppler demonstrate normal flow to the right ovary with no evidence of  ovarian torsion. Arterial and venous waveforms are normal.    > Hypoechoic structure in the left ovary measures 2.1 x 1.4 x 1.9 cm.     OTHER: Complex fluid is seen in the cul-de-sac with several septations noted.     Suboptimal exam due to bowel gas .     _______________     IMPRESSION  IMPRESSION:     1. Complex fluid extending from the right ovary into the right adnexa with  internal septations may represent complex fluid within the fallopian tube (as  may be seen in tubo-ovarian abscess) versus a complex/complicated paraovarian  cyst or inclusion cyst. Contrast-enhanced MRI may be used for further  characterization as clinically indicated. 2.  Arterial and venous blood flow seen in both ovaries however given mildly  enlarged ovarian size, intermittent torsion/detorsion cannot be excluded. 3.  Hypoechoic mass in the inferior uterus/cervix appears similar to 2019 and  may represent a fibroid. TVUS 7/2/2019  FINDINGS:      Uterus: The uterus is normal in size. The uterus measured 10.4 x 4.7 x 7.0 cm. The posterior uterine wall demonstrated a focal oval area of hypoechogenicity  measuring 1.4 x 0.7 x 1.5 cm. This likely represents a leiomyoma. .  The  endometrial stripe was normal in thickness and measured 16 mm in double wall  thickness.       Ovaries: Both ovaries are identified. The right ovary measured 6.9 x 3.3 x 4.5  cm. The left ovary measured 5.0 x 4.0 x 4.9 cm. Multiple right ovarian  follicles are present. The largest demonstrates a diameter of 2.6 cm.  The left  ovary shows a focal hypoechoic structure measuring 4.5 x 3.0 x 3.4 cm. This does  not demonstrate internal flow. This may represent a cyst with debris or  hemorrhage. Bilateral ovarian vascular flow is demonstrated.       Other: An area of free fluid is noted between the right and left ovaries  posterior to the uterus. Some of this may be related to a large left ovarian  cyst on the left that was noted on the prior abdominal CT     _______________     IMPRESSION  IMPRESSION:     Bilateral ovarian cysts are present. A left ovarian cyst demonstrates  homogeneous hypoechoic material which could represent either proteinaceous  material/debris or hemorrhage. A small amount of fluid is noted in the pelvis. An anechoic cystic area seen posterior to the uterus which could correlate with  the large 6 cm cystic structure on recent abdominal CT     Small focal hypoechoic area within uterine wall which is likely a leiomyoma  5/8/19  CT  FINDINGS:     LOWER CHEST: Within normal limits.     LIVER, BILIARY: Liver is normal. No biliary dilation. Tiny gallstone is present. No signs of cholecystitis.     PANCREAS: Normal.     SPLEEN: Normal.     ADRENALS: Normal.     KIDNEYS: Normal.     LYMPH NODES: No enlarged lymph nodes.     GASTROINTESTINAL TRACT: No bowel dilation or wall thickening. Normal appendix.     PELVIC ORGANS: Left paramedian transgluteal pigtail drainage catheter is present  in the posterior left pelvis. No residual fluid collection around the catheter. Slightly further cephalad in the left pelvis, 6.2 x 3.8 cm focus of low-density  fluid previously measured 6.9 x 5.4 cm and in the right hemipelvis, 3.5 x 3.1 cm  low-density fluid collection is present measuring 2.9 x 1.7 cm, and was  previously moderately dense, now low density. A couple other smaller rounded  fluid collections are present in the lower pelvis near the midline.  Urinary  bladder appears normal.     VASCULATURE: Within normal limits.     BONES: No acute or aggressive osseous abnormalities identified.     OTHER: None.     _______________     IMPRESSION  IMPRESSION:        1. Interval complete drainage of previous left pelvic fluid collection, with  pigtail catheter in situ. 2. Interval diminished size of 6.2 x 3.8 cm (previously 6.9 x 5.4 cm) left mid  pelvic uncomplicated appearing cyst. 3.5 x 3.1 cm right pelvic uncomplicated  cystic appearing collection previously measured 2.9 x 1.7 cm, however, was  previously complicated with internal debris. Other smaller rounded areas of  pelvic fluid are not significantly changed. None of these areas of focal fluid  containing gas, and are not strongly suspicious of tubo-ovarian abscess. 3. Tiny gallstone, no signs of cholecystitis. ROS:   As above      There are no active problems to display for this patient.     Past Medical History:   Diagnosis Date    Dermoid 2017    Tubo-ovarian abscess       Past Surgical History:   Procedure Laterality Date    HX  SECTION  , 2006    X2    HX PELVIC LAPAROSCOPY  2019    Diagnostic for infection    HX TUBAL LIGATION Bilateral     HX WISDOM TEETH EXTRACTION      upper teeth    LAP,TUBAL CAUTERY        OB History        4    Para   2    Term   2       0    AB   0    Living           SAB   0    TAB   0    Ectopic   0    Molar   0    Multiple        Live Births              Obstetric Comments   C-sections X2           Social History     Tobacco Use    Smoking status: Passive Smoke Exposure - Never Smoker    Smokeless tobacco: Never Used    Tobacco comment:  vapes   Substance Use Topics    Alcohol use: Yes     Comment: \"occassionally\" wine      Family History   Problem Relation Age of Onset    Hypertension Mother     High Cholesterol Mother     Hypertension Father     High Cholesterol Father     Other Father         Pre-diabetic    No Known Problems Brother     Colon Cancer Maternal Aunt     Cancer Maternal Aunt         colon    Hypertension Maternal Grandmother     Stroke Maternal Grandmother     Diabetes Maternal Grandmother     Hypertension Maternal Grandfather     Stroke Maternal Grandfather     Colon Cancer Maternal Grandfather     Diabetes Paternal Grandmother     Hypertension Paternal Grandmother     Stroke Paternal Grandmother     Hypertension Paternal Hilda Burkitt     Stroke Paternal Grandfather       Current Outpatient Medications   Medication Sig    ascorbic acid, vitamin C, (Vitamin C) 500 mg tablet Take 1,000 mg by mouth daily.  cholecalciferol, vitamin D3, (Vitamin D3) 50 mcg (2,000 unit) tab Take  by mouth daily.  fexofenadine HCl (ALLEGRA PO) Take  by mouth as needed for Other (allergies).  fluticasone propionate (FLONASE NA) by Nasal route as needed for Other (allergies).  multivitamin (ONE A DAY) tablet Take 1 Tab by mouth daily.  pseudoephedrine (Sudafed) 30 mg tablet Take  by mouth every four (4) hours as needed for Congestion.  ibuprofen (MOTRIN) 200 mg tablet Take  by mouth as needed for Pain. No current facility-administered medications for this visit. Allergies   Allergen Reactions    Clindamycin Rash     Topical           OBJECTIVE:    Physical Exam  VITAL SIGNS: Visit Vitals  /75 (BP 1 Location: Left arm, BP Patient Position: Sitting)   Pulse 71   Temp 99 °F (37.2 °C) (Oral)   Ht 5' 4\" (1.626 m)   Wt 66.6 kg (146 lb 12.8 oz)   LMP 10/22/2020   SpO2 99%   BMI 25.20 kg/m²      GENERAL TRELL: in no apparent distress and well developed and well nourished   ABD: soft, NT. Inc: C/D/I      IMPRESSION/PLAN:  1. TOA   -s/p IR drain placement and resection   -reviewed pathology   -f/u prn      The total time spent was 25 minutes regarding this patients diagnosis of tubo-ovarian abscess and >50% of this time was spent counseling and coordinating care    [address] [place] [address]  Gynecologic Oncology  11/10/190244:22 AM

## 2020-11-10 NOTE — PROGRESS NOTES
Carlin Duvall is a 41 y.o. female presents in office for pathology results    Chief Complaint   Patient presents with    Follow-up      Pt c/o abdominal tenderness  Do you have any unusual vaginal bleeding, discharge or irritation? no  Do you have any changes in your bowel movements? no  Have you been experiencing nausea or vomiting? no  Have you been experiencing any continuous or worsening abdominal pain? no  Any urinary burning? no    Visit Vitals  /75 (BP 1 Location: Left arm, BP Patient Position: Sitting)   Pulse 71   Temp 99 °F (37.2 °C) (Oral)   Ht 5' 4\" (1.626 m)   Wt 146 lb 12.8 oz (66.6 kg)   SpO2 99%   BMI 25.20 kg/m²         1. Have you been to the ER, urgent care clinic since your last visit? Hospitalized since your last visit? No    2. Have you seen or consulted any other health care providers outside of the [address] [place] [address] since your last visit? Include any pap smears or colon screening.  No    3 most recent PHQ Screens 11/10/2020   Little interest or pleasure in doing things Not at all   Feeling down, depressed, irritable, or hopeless Not at all   Total Score PHQ 2 0         Learning Assessment 4/18/2019   PRIMARY LEARNER Patient   HIGHEST LEVEL OF EDUCATION - PRIMARY LEARNER  > 4 YEARS OF COLLEGE   BARRIERS PRIMARY LEARNER NONE   CO-LEARNER CAREGIVER No   PRIMARY LANGUAGE ENGLISH   LEARNER PREFERENCE PRIMARY OTHER (COMMENT)   ANSWERED BY patient   RELATIONSHIP SELF

## (undated) DEVICE — DRAPE XR C ARM 41X74IN LF --

## (undated) DEVICE — VISUALIZATION SYSTEM: Brand: CLEARIFY

## (undated) DEVICE — Device

## (undated) DEVICE — TIP IU L8CM DIA6.7MM BLU SIL FLX DISP RUMI II

## (undated) DEVICE — SMALL GRASPING RETRACTOR: Brand: ENDOWRIST

## (undated) DEVICE — GARMENT,MEDLINE,DVT,INT,CALF,MED, GEN2: Brand: MEDLINE

## (undated) DEVICE — TISSUE RETRIEVAL SYSTEM: Brand: INZII RETRIEVAL SYSTEM

## (undated) DEVICE — SUT VCRL + 0 36IN UR6 VIO --

## (undated) DEVICE — DISPOSABLE SUCTION/IRRIGATOR TUBE SET WITH TIP: Brand: AHTO

## (undated) DEVICE — COVER MPLR TIP CRV SCIS ACC DA VINCI

## (undated) DEVICE — AIRSEAL 5 MM ACCESS PORT AND LOW PROFILE OBTURATOR WITH BLADELESS OPTICAL TIP, 100 MM LENGTH: Brand: AIRSEAL

## (undated) DEVICE — STERILE POLYISOPRENE POWDER-FREE SURGICAL GLOVES: Brand: PROTEXIS

## (undated) DEVICE — TRAP MUCUS SPECIMEN 40ML -- MEDICHOICE

## (undated) DEVICE — SUT MONOCRYL PLUS UD 4-0 --

## (undated) DEVICE — BLADELESS OBTURATOR: Brand: WECK VISTA

## (undated) DEVICE — STERILE POLYISOPRENE POWDER-FREE SURGICAL GLOVES WITH EMOLLIENT COATING: Brand: PROTEXIS

## (undated) DEVICE — SOLUTION LACTATED RINGERS INJECTION USP

## (undated) DEVICE — ROBOTIC PACK: Brand: MEDLINE INDUSTRIES, INC.

## (undated) DEVICE — 40595 XL TRENDELENBURG POSITIONING KIT: Brand: 40595 XL TRENDELENBURG POSITIONING KIT

## (undated) DEVICE — REM POLYHESIVE ADULT PATIENT RETURN ELECTRODE: Brand: VALLEYLAB

## (undated) DEVICE — TRI-LUMEN FILTERED TUBE SET WITH ACTIVATED CHARCOAL FILTER: Brand: AIRSEAL

## (undated) DEVICE — SOL IRRIGATION INJ NACL 0.9% 500ML BTL

## (undated) DEVICE — PREP SKN CHLRAPRP APL 26ML STR --

## (undated) DEVICE — SUTURE PDS II SZ 2-0 L27IN ABSRB VLT L36MM CT-1 1/2 CIR Z339H

## (undated) DEVICE — SYR 10ML LUER LOK 1/5ML GRAD --

## (undated) DEVICE — ARM DRAPE

## (undated) DEVICE — TOTAL TRAY, DB, 100% SILI FOLEY, 16FR 10: Brand: MEDLINE

## (undated) DEVICE — COLUMN DRAPE

## (undated) DEVICE — SEAL UNIV 5-8MM DISP BX/10 -- DA VINCI XI - SNGL USE

## (undated) DEVICE — TOWEL,OR,DSP,ST,BLUE,STD,4/PK,20PK/CS: Brand: MEDLINE